# Patient Record
Sex: MALE | Race: WHITE | NOT HISPANIC OR LATINO | Employment: FULL TIME | URBAN - METROPOLITAN AREA
[De-identification: names, ages, dates, MRNs, and addresses within clinical notes are randomized per-mention and may not be internally consistent; named-entity substitution may affect disease eponyms.]

---

## 2022-03-17 ENCOUNTER — OFFICE VISIT (OUTPATIENT)
Dept: FAMILY MEDICINE CLINIC | Facility: CLINIC | Age: 52
End: 2022-03-17

## 2022-03-17 VITALS
TEMPERATURE: 97.8 F | SYSTOLIC BLOOD PRESSURE: 116 MMHG | BODY MASS INDEX: 35.03 KG/M2 | HEART RATE: 74 BPM | WEIGHT: 250.2 LBS | HEIGHT: 71 IN | DIASTOLIC BLOOD PRESSURE: 70 MMHG | OXYGEN SATURATION: 98 %

## 2022-03-17 DIAGNOSIS — D68.9 CLOTTING DISORDER: ICD-10-CM

## 2022-03-17 DIAGNOSIS — E78.5 HYPERLIPIDEMIA, UNSPECIFIED HYPERLIPIDEMIA TYPE: ICD-10-CM

## 2022-03-17 DIAGNOSIS — Z86.718 HISTORY OF RECURRENT DEEP VEIN THROMBOSIS (DVT): ICD-10-CM

## 2022-03-17 DIAGNOSIS — Z00.00 HEALTH MAINTENANCE EXAMINATION: Primary | ICD-10-CM

## 2022-03-17 DIAGNOSIS — Z12.5 SCREENING FOR MALIGNANT NEOPLASM OF PROSTATE: ICD-10-CM

## 2022-03-17 PROCEDURE — 99386 PREV VISIT NEW AGE 40-64: CPT | Performed by: NURSE PRACTITIONER

## 2022-03-17 RX ORDER — WARFARIN SODIUM 5 MG/1
1 TABLET ORAL 2 TIMES DAILY
COMMUNITY
Start: 2022-03-07 | End: 2022-03-23 | Stop reason: SDUPTHER

## 2022-03-17 NOTE — PROGRESS NOTES
Subjective   Darshan Frias is a 52 y.o. male.     Chief Complaint   Patient presents with   • Annual Exam     This is my first time seeing this patient.   History of Present Illness   The patient is being seen for a health maintenance evaluation.  The last health maintenance visit is unknown.  Social history: Household members include spouse, stepdaughter and stepson.  He is .  Work status: Full-time.  The patient has never smoked cigarettes.  He reports occasional alcohol use.  General health: The patient's health is described as good.  He has regular dental visits.  The patient brushes 2 times a day, flosses daily and reports his last dental visit was less than 6 months ago.  He denies vision problems.  Vision care includes no need for vision correction and an eye exam within the past year.  He denies hearing loss.  Immunization status: Shingrix, influenza and Covid vaccinations needed.  Lifestyle: He does not exercise regularly.  Reproductive health: He is sexually active.  Screening: A colonoscopy was performed on 3/16/2020.    The following portions of the patient's history were reviewed and updated as appropriate: allergies, current medications, past family history, past medical history, past social history, past surgical history and problem list.    Past Medical History:   Diagnosis Date   • Deep vein thrombosis (HCC)        History reviewed. No pertinent surgical history.    History reviewed. No pertinent family history.    Social History     Socioeconomic History   • Marital status:    Tobacco Use   • Smoking status: Never Smoker   Substance and Sexual Activity   • Alcohol use: Yes     Comment: occasionally       Review of Systems   Constitutional: Negative for fever.   HENT: Negative for ear pain, rhinorrhea and sore throat.    Eyes: Negative for visual disturbance.   Respiratory: Negative for cough and shortness of breath.    Cardiovascular: Negative for chest pain.   Gastrointestinal:  "Negative for abdominal pain, diarrhea, nausea and vomiting.   Genitourinary: Negative.    Musculoskeletal: Negative.    Skin: Negative for rash.   Neurological: Negative for dizziness and headache.   Psychiatric/Behavioral: Negative for depressed mood.       Objective   Vitals:    03/17/22 1100   BP: 116/70   BP Location: Left arm   Patient Position: Sitting   Cuff Size: Large Adult   Pulse: 74   Temp: 97.8 °F (36.6 °C)   TempSrc: Temporal   SpO2: 98%   Weight: 113 kg (250 lb 3.2 oz)   Height: 180.3 cm (71\")      Body mass index is 34.9 kg/m².  Physical Exam  Vitals and nursing note reviewed.   Constitutional:       Appearance: Normal appearance.   HENT:      Head: Normocephalic and atraumatic.      Right Ear: Tympanic membrane and ear canal normal.      Left Ear: Tympanic membrane and ear canal normal.   Eyes:      Conjunctiva/sclera: Conjunctivae normal.      Pupils: Pupils are equal, round, and reactive to light.   Cardiovascular:      Rate and Rhythm: Normal rate and regular rhythm.      Heart sounds: Normal heart sounds.   Pulmonary:      Effort: Pulmonary effort is normal.      Breath sounds: Normal breath sounds.   Abdominal:      General: Bowel sounds are normal.      Palpations: Abdomen is soft.      Tenderness: There is no abdominal tenderness.   Genitourinary:     Comments: Declined   Musculoskeletal:         General: Normal range of motion.      Cervical back: Neck supple.   Skin:     General: Skin is warm and dry.   Neurological:      Mental Status: He is alert and oriented to person, place, and time.   Psychiatric:         Mood and Affect: Mood normal.           Assessment/Plan   Diagnoses and all orders for this visit:    1. Health maintenance examination (Primary)  -     Varicella zoster antibody, IgG    2. Hyperlipidemia, unspecified hyperlipidemia type  -     Lipid Panel With / Chol / HDL Ratio  -     Comprehensive Metabolic Panel    3. Clotting disorder (HCC)  -     Protime-INR    4. History of " recurrent deep vein thrombosis (DVT)  -     Protime-INR    5. Screening for malignant neoplasm of prostate  -     PSA Screen    Impression, currently, he has an inadequate exercise regimen.  Colorectal cancer screening is current.  PSA ordered today.  Additional screening lab work includes varicella-zoster antibody, lipid panel, CMP and PT/INR.  Patient declines vaccinations at this time.  Advice and education were given regarding diet.

## 2022-03-18 LAB
ALBUMIN SERPL-MCNC: 4.4 G/DL (ref 3.8–4.9)
ALBUMIN/GLOB SERPL: 1.5 {RATIO} (ref 1.2–2.2)
ALP SERPL-CCNC: 109 IU/L (ref 44–121)
ALT SERPL-CCNC: 44 IU/L (ref 0–44)
AST SERPL-CCNC: 29 IU/L (ref 0–40)
BILIRUB SERPL-MCNC: 0.6 MG/DL (ref 0–1.2)
BUN SERPL-MCNC: 13 MG/DL (ref 6–24)
BUN/CREAT SERPL: 14 (ref 9–20)
CALCIUM SERPL-MCNC: 9.5 MG/DL (ref 8.7–10.2)
CHLORIDE SERPL-SCNC: 102 MMOL/L (ref 96–106)
CHOLEST SERPL-MCNC: 213 MG/DL (ref 100–199)
CHOLEST/HDLC SERPL: 4.3 RATIO (ref 0–5)
CO2 SERPL-SCNC: 23 MMOL/L (ref 20–29)
CREAT SERPL-MCNC: 0.94 MG/DL (ref 0.76–1.27)
EGFRCR SERPLBLD CKD-EPI 2021: 98 ML/MIN/1.73
GLOBULIN SER CALC-MCNC: 3 G/DL (ref 1.5–4.5)
GLUCOSE SERPL-MCNC: 82 MG/DL (ref 65–99)
HDLC SERPL-MCNC: 50 MG/DL
INR PPP: 1.6 (ref 0.9–1.2)
LDLC SERPL CALC-MCNC: 145 MG/DL (ref 0–99)
POTASSIUM SERPL-SCNC: 4.7 MMOL/L (ref 3.5–5.2)
PROT SERPL-MCNC: 7.4 G/DL (ref 6–8.5)
PROTHROMBIN TIME: 17.1 SEC (ref 9.1–12)
PSA SERPL-MCNC: 1.2 NG/ML (ref 0–4)
SODIUM SERPL-SCNC: 140 MMOL/L (ref 134–144)
TRIGL SERPL-MCNC: 103 MG/DL (ref 0–149)
VLDLC SERPL CALC-MCNC: 18 MG/DL (ref 5–40)
VZV IGG SER IA-ACNC: 1660 INDEX

## 2022-03-23 ENCOUNTER — TELEPHONE (OUTPATIENT)
Dept: FAMILY MEDICINE CLINIC | Facility: CLINIC | Age: 52
End: 2022-03-23

## 2022-03-23 RX ORDER — WARFARIN SODIUM 5 MG/1
10 TABLET ORAL DAILY
Start: 2022-03-23 | End: 2022-07-13 | Stop reason: SDUPTHER

## 2022-03-23 NOTE — TELEPHONE ENCOUNTER
LDL (bad cholesterol) is elevated at 145 but 10-year CVD risk score is good at 3.9% so would recommend working on diet and exercise.  Blood sugar is normal.  Kidney function tests are normal.  Electrolytes are normal.  Liver function tests are normal.  PSA is within normal range.  INR is low at 1.6 so we need to review current treatment regimen.  Varicella antibody is positive so would recommend Shingrix.    Patient aware of results and recommendations.  Patient had not been adherent to warfarin.  Stressed to patient importance of medication adherence.  Will repeat INR in 2 weeks.

## 2022-03-29 ENCOUNTER — OFFICE VISIT (OUTPATIENT)
Dept: FAMILY MEDICINE CLINIC | Facility: CLINIC | Age: 52
End: 2022-03-29

## 2022-03-29 ENCOUNTER — HOSPITAL ENCOUNTER (OUTPATIENT)
Dept: GENERAL RADIOLOGY | Facility: HOSPITAL | Age: 52
Discharge: HOME | End: 2022-03-29
Admitting: NURSE PRACTITIONER

## 2022-03-29 ENCOUNTER — TELEPHONE (OUTPATIENT)
Dept: FAMILY MEDICINE CLINIC | Facility: CLINIC | Age: 52
End: 2022-03-29

## 2022-03-29 VITALS
BODY MASS INDEX: 35.31 KG/M2 | TEMPERATURE: 97.7 F | SYSTOLIC BLOOD PRESSURE: 119 MMHG | WEIGHT: 252.2 LBS | DIASTOLIC BLOOD PRESSURE: 79 MMHG | OXYGEN SATURATION: 98 % | HEART RATE: 82 BPM | HEIGHT: 71 IN

## 2022-03-29 DIAGNOSIS — R53.83 FATIGUE, UNSPECIFIED TYPE: ICD-10-CM

## 2022-03-29 DIAGNOSIS — M25.562 ACUTE PAIN OF LEFT KNEE: Primary | ICD-10-CM

## 2022-03-29 DIAGNOSIS — Z86.718 HISTORY OF RECURRENT DEEP VEIN THROMBOSIS (DVT): ICD-10-CM

## 2022-03-29 DIAGNOSIS — M25.562 ACUTE PAIN OF LEFT KNEE: ICD-10-CM

## 2022-03-29 PROCEDURE — 99213 OFFICE O/P EST LOW 20 MIN: CPT | Performed by: NURSE PRACTITIONER

## 2022-03-29 PROCEDURE — 73560 X-RAY EXAM OF KNEE 1 OR 2: CPT

## 2022-03-29 NOTE — TELEPHONE ENCOUNTER
Would recommend Ortho referral for small left knee effusion.    Patient aware of results and recommendations.

## 2022-03-30 NOTE — PROGRESS NOTES
New Knee      Patient: Darshan Frias        YOB: 1970    Medical Record Number: [id number]        Chief Complaints: Left knee pain      History of Present Illness: This is a 52-year-old male who presents complaining of left knee pain that began about 2 to 3 weeks ago.  He was sitting on a very low stool when he stood to get up his knee caught popped and had severe pain.  His symptoms are worsening at times he cannot fully extend he has a hard time bearing weight he does have an antalgic gait.  No prior history of similar symptoms he is on chronic Coumadin so cannot take anti-inflammatories been doing ice is been doing stretching strengthening and his symptoms are worsening.  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     No current facility-administered medications on file prior to visit.     Current Medications:  Scheduled Meds:  Continuous Infusions:No current facility-administered medications for this visit.    PRN Meds:.    Past Medical History:   Diagnosis Date   • Deep vein thrombosis (HCC)       No past surgical history on file.     Social History     Occupational History   • Not on file   Tobacco Use   • Smoking status: Never Smoker   • Smokeless tobacco: Not on file   Substance and Sexual Activity   • Alcohol use: Yes     Comment: occasionally   • Drug use: Not on file   • Sexual activity: Not on file      Social History     Social History Narrative   • Not on file      No family history on file.          Review of Systems: 14 point review of systems are remarkable for the knee pain only the remainder negative per the  patient    Review of Systems      Physical Exam: 52 y.o. male  General Appearance:    Alert, cooperative, in no acute distress                 There were no vitals filed for this visit.   Patient is alert and read ×3 no acute distress appears her above-listed at height weight and age.  Affect is normal respiratory rate is normal unlabored. Heart rate regular rate rhythm, sclera, dentition and hearing are normal for the purpose of this exam.        Ortho Exam Physical exam of the left knee reveals no effusion no redness.  The patient does have tenderness about the medial joint line.  No tenderness about the lateral joint line.  A negative bounce home and a positive medial Daniel.  There is some pain medially  with a lateral Daniel.  Patient has a stable ligamentous exam.  Quads are reasonable and symmetric bilaterally.  Calf is soft and nontender.  There is no overlying skin changes no lymphedema lymphadenopathy.  Patient has good hip range of motion full symmetric and asymptomatic and a normal ankle exam.  He does have loss of extension about 3 to 5 degrees full flexion    Procedures             Radiology:   AP, Lateral and merchant views of the left knee  were /reviewed to evauateknee pain.  I have no comparative films these were reviewed in epic they were standing films SCS good maintenance with joint space no acute pathology  Imaging Results (Most Recent)     None        Assessment/Plan:  Left knee pain I suspect this is a meniscus tear he cannot take anti-inflammatories he has difficulty with terminal extension sometimes difficulty bearing weight he has an antalgic gait plan is to proceed with an MRI

## 2022-03-30 NOTE — PROGRESS NOTES
"Chief Complaint  Knee Pain    Subjective          Darshan Frias presents to [facility]  Knee Pain   The incident occurred more than 1 week ago. The incident occurred at home. The injury mechanism was a twisting injury. The pain is present in the left knee. The pain is moderate. Pertinent negatives include no inability to bear weight, muscle weakness or numbness. The symptoms are aggravated by weight bearing. He has tried acetaminophen for the symptoms. The treatment provided no relief.     Objective   Vital Signs:   /79   Pulse 82   Temp 97.7 °F (36.5 °C)   Ht 180.3 cm (70.98\")   Wt 114 kg (252 lb 3.2 oz)   SpO2 98%   BMI 35.19 kg/m²       Physical Exam  Vitals and nursing note reviewed.   Cardiovascular:      Rate and Rhythm: Normal rate and regular rhythm.      Heart sounds: Normal heart sounds.   Pulmonary:      Effort: Pulmonary effort is normal.      Breath sounds: Normal breath sounds.   Musculoskeletal:      Left knee: Tenderness present over the lateral joint line.   Neurological:      Mental Status: He is oriented to person, place, and time.   Psychiatric:         Mood and Affect: Mood normal.        Result Review :            Assessment and Plan    Diagnoses and all orders for this visit:    1. Acute pain of left knee (Primary)  -     XR Knee 1 or 2 View Left; Future    2. Fatigue, unspecified type  -     Testosterone, Free, Total  -     TSH Rfx On Abnormal To Free T4  -     Vitamin D 25 Hydroxy  -     CBC & Differential    3. History of recurrent deep vein thrombosis (DVT)  -     Protime-INR      I spent 20 minutes caring for Darshan on this date of service. This time includes time spent by me in the following activities:performing a medically appropriate examination and/or evaluation , counseling and educating the patient/family/caregiver, ordering medications, tests, or procedures and documenting information in the medical record  Follow Up   Follow-up pending " results.   Patient was given instructions and counseling regarding his condition or for health maintenance advice. Please see specific information pulled into the AVS if appropriate.

## 2022-03-31 ENCOUNTER — OFFICE VISIT (OUTPATIENT)
Dept: ORTHOPEDIC SURGERY | Facility: CLINIC | Age: 52
End: 2022-03-31

## 2022-03-31 ENCOUNTER — TELEPHONE (OUTPATIENT)
Dept: FAMILY MEDICINE CLINIC | Facility: CLINIC | Age: 52
End: 2022-03-31

## 2022-03-31 VITALS — HEIGHT: 71 IN | WEIGHT: 240.1 LBS | TEMPERATURE: 97.6 F | BODY MASS INDEX: 33.61 KG/M2

## 2022-03-31 DIAGNOSIS — S83.242A TEAR OF MEDIAL MENISCUS OF LEFT KNEE, CURRENT, UNSPECIFIED TEAR TYPE, INITIAL ENCOUNTER: Primary | ICD-10-CM

## 2022-03-31 LAB
25(OH)D3+25(OH)D2 SERPL-MCNC: 19.6 NG/ML (ref 30–100)
BASOPHILS # BLD AUTO: 0.1 X10E3/UL (ref 0–0.2)
BASOPHILS NFR BLD AUTO: 1 %
EOSINOPHIL # BLD AUTO: 0.4 X10E3/UL (ref 0–0.4)
EOSINOPHIL NFR BLD AUTO: 5 %
ERYTHROCYTE [DISTWIDTH] IN BLOOD BY AUTOMATED COUNT: 12.5 % (ref 11.6–15.4)
HCT VFR BLD AUTO: 46.5 % (ref 37.5–51)
HGB BLD-MCNC: 15.6 G/DL (ref 13–17.7)
IMM GRANULOCYTES # BLD AUTO: 0 X10E3/UL (ref 0–0.1)
IMM GRANULOCYTES NFR BLD AUTO: 0 %
INR PPP: 2.3 (ref 0.9–1.2)
LYMPHOCYTES # BLD AUTO: 2.2 X10E3/UL (ref 0.7–3.1)
LYMPHOCYTES NFR BLD AUTO: 30 %
MCH RBC QN AUTO: 29.7 PG (ref 26.6–33)
MCHC RBC AUTO-ENTMCNC: 33.5 G/DL (ref 31.5–35.7)
MCV RBC AUTO: 89 FL (ref 79–97)
MONOCYTES # BLD AUTO: 0.4 X10E3/UL (ref 0.1–0.9)
MONOCYTES NFR BLD AUTO: 5 %
NEUTROPHILS # BLD AUTO: 4.3 X10E3/UL (ref 1.4–7)
NEUTROPHILS NFR BLD AUTO: 59 %
PLATELET # BLD AUTO: 253 X10E3/UL (ref 150–450)
PROTHROMBIN TIME: 24.4 SEC (ref 9.1–12)
RBC # BLD AUTO: 5.25 X10E6/UL (ref 4.14–5.8)
TESTOST FREE SERPL-MCNC: 9.4 PG/ML (ref 7.2–24)
TESTOST SERPL-MCNC: 564 NG/DL (ref 264–916)
TSH SERPL DL<=0.005 MIU/L-ACNC: 1.72 UIU/ML (ref 0.45–4.5)
WBC # BLD AUTO: 7.4 X10E3/UL (ref 3.4–10.8)

## 2022-03-31 PROCEDURE — 99203 OFFICE O/P NEW LOW 30 MIN: CPT | Performed by: ORTHOPAEDIC SURGERY

## 2022-03-31 RX ORDER — ERGOCALCIFEROL 1.25 MG/1
50000 CAPSULE ORAL WEEKLY
Qty: 12 CAPSULE | Refills: 1 | Status: SHIPPED | OUTPATIENT
Start: 2022-03-31 | End: 2023-01-11

## 2022-03-31 NOTE — TELEPHONE ENCOUNTER
INR is therapeutic so continue current warfarin dosage.  Will repeat PT/INR in 1 month.  Free and total testosterone levels are normal.  Thyroid function test is normal.  Vitamin D is deficient so will need to start vitamin D2 50,000 units weekly and repeat vitamin D level in 6 months.  CBC is within acceptable limits.    Patient aware of results and recommendations.

## 2022-04-11 ENCOUNTER — HOSPITAL ENCOUNTER (OUTPATIENT)
Dept: MRI IMAGING | Facility: HOSPITAL | Age: 52
Discharge: HOME | End: 2022-04-11
Admitting: ORTHOPAEDIC SURGERY

## 2022-04-11 DIAGNOSIS — S83.242A TEAR OF MEDIAL MENISCUS OF LEFT KNEE, CURRENT, UNSPECIFIED TEAR TYPE, INITIAL ENCOUNTER: ICD-10-CM

## 2022-04-11 PROCEDURE — 73721 MRI JNT OF LWR EXTRE W/O DYE: CPT

## 2022-04-21 ENCOUNTER — CLINICAL SUPPORT (OUTPATIENT)
Dept: ORTHOPEDIC SURGERY | Facility: CLINIC | Age: 52
End: 2022-04-21

## 2022-04-21 VITALS — BODY MASS INDEX: 33.6 KG/M2 | TEMPERATURE: 97.8 F | WEIGHT: 240 LBS | HEIGHT: 71 IN

## 2022-04-21 DIAGNOSIS — M17.12 PRIMARY LOCALIZED OSTEOARTHROSIS OF LEFT LOWER LEG: Primary | ICD-10-CM

## 2022-04-21 PROCEDURE — 99213 OFFICE O/P EST LOW 20 MIN: CPT | Performed by: ORTHOPAEDIC SURGERY

## 2022-04-21 PROCEDURE — 20610 DRAIN/INJ JOINT/BURSA W/O US: CPT | Performed by: ORTHOPAEDIC SURGERY

## 2022-04-21 RX ORDER — METHYLPREDNISOLONE ACETATE 80 MG/ML
80 INJECTION, SUSPENSION INTRA-ARTICULAR; INTRALESIONAL; INTRAMUSCULAR; SOFT TISSUE
Status: COMPLETED | OUTPATIENT
Start: 2022-04-21 | End: 2022-04-21

## 2022-04-21 RX ADMIN — METHYLPREDNISOLONE ACETATE 80 MG: 80 INJECTION, SUSPENSION INTRA-ARTICULAR; INTRALESIONAL; INTRAMUSCULAR; SOFT TISSUE at 09:58

## 2022-04-21 NOTE — PROGRESS NOTES
Patient: Darshan Frias  YOB: 1970  Date of Service: 4/21/2022    Chief Complaints: Left knee pain MRI follow-up    Subjective:    History of Present Illness: Pt is seen in the office today with complaints of left knee pain he is here follow-up MRI.  MRI demonstrates degenerative change the patellofemoral joint primarily on the patella and a medial femoral condyle defect.  No meniscus tear..          Allergies: No Known Allergies    Medications:   Home Medications:  Current Outpatient Medications on File Prior to Visit   Medication Sig   • vitamin D (ERGOCALCIFEROL) 1.25 MG (08663 UT) capsule capsule Take 1 capsule by mouth 1 (One) Time Per Week.   • warfarin (COUMADIN) 5 MG tablet Take 2 tablets by mouth Daily.     No current facility-administered medications on file prior to visit.     Current Medications:  Scheduled Meds:  Continuous Infusions:No current facility-administered medications for this visit.    PRN Meds:.    I have reviewed the patient's medical history in detail and updated the computerized patient record.  Review and summarization of old records include:    Past Medical History:   Diagnosis Date   • Deep vein thrombosis (HCC)       No past surgical history on file.     Social History     Occupational History   • Not on file   Tobacco Use   • Smoking status: Never Smoker   • Smokeless tobacco: Not on file   Substance and Sexual Activity   • Alcohol use: Yes     Comment: occasionally   • Drug use: Not on file   • Sexual activity: Not on file      Social History     Social History Narrative   • Not on file      No family history on file.    ROS: 14 point review of systems was performed and was negative except for documented findings in HPI and today's encounter.     Allergies: No Known Allergies  Constitutional:  Denies fever, shaking or chills   Eyes:  Denies change in visual acuity   HENT:  Denies nasal congestion or sore throat   Respiratory:  Denies cough or shortness of breath    Cardiovascular:  Denies chest pain or severe LE edema   GI:  Denies abdominal pain, nausea, vomiting, bloody stools or diarrhea   Musculoskeletal:  Numbness, tingling, or loss of motor function only as noted above in history of present illness.  : Denies painful urination or hematuria  Integument:  Denies rash, lesion or ulceration   Neurologic:  Denies headache or focal weakness  Endocrine:  Denies lymphadenopathy  Psych:  Denies confusion or change in mental status   Hem:  Denies active bleeding      Physical Exam: 52 y.o. male  Wt Readings from Last 3 Encounters:   03/31/22 109 kg (240 lb 1.6 oz)   03/29/22 114 kg (252 lb 3.2 oz)   03/17/22 113 kg (250 lb 3.2 oz)       There is no height or weight on file to calculate BMI.  No height and weight on file for this encounter.  There were no vitals filed for this visit.  Vital signs reviewed.   General Appearance:    Alert, cooperative, in no acute distress                    Ortho exam  Physical exam of the left knee reveals no effusion no redness.  The patient does have tenderness about the medial joint line.  No tenderness about the lateral joint line.  A negative bounce home and a positive medial Daniel.  There is some pain medially  with a lateral Daniel.  Patient has a stable ligamentous exam.  Quads are reasonable and symmetric bilaterally.  Calf is soft and nontender.  There is no overlying skin changes no lymphedema lymphadenopathy.  Patient has good hip range of motion full symmetric and asymptomatic and a normal ankle exam.  Large Joint Arthrocentesis: L knee  Date/Time: 4/21/2022 9:58 AM  Consent given by: patient  Site marked: site marked  Timeout: Immediately prior to procedure a time out was called to verify the correct patient, procedure, equipment, support staff and site/side marked as required   Supporting Documentation  Indications: pain   Procedure Details  Location: knee - L knee  Preparation: Patient was prepped and draped in the usual  sterile fashion  Needle gauge: 21.  Approach: medial  Medications administered: 2 mL lidocaine (cardiac); 80 mg methylPREDNISolone acetate 80 MG/ML  Patient tolerance: patient tolerated the procedure well with no immediate complications          MRIs as above have reviewed the films myself and agree with the findings  .time    Assessment: Left knee pain I suspect is coming from the degenerative changes at his patella and the medial femoral condyle defect I explained to him what this is I showed it to him on MRI.  Explained the natural history of these things to get worse and is imperative that he continue to work on strengthening and weight loss.  I think he is lost about 30 pounds with think he is really working on it from that end.  Talked about options here I think injections quite reasonable could consider gel in the future could consider 's we will see how he does with the cortisone    Plan:   Follow up as indicated.  Ice, elevate, and rest as needed.  Discussed conservative measures of pain control including ice, bracing.  Also talked about the importance of strengthening and maintaining ideal body weight    Bessie Bergeron M.D.

## 2022-07-13 RX ORDER — WARFARIN SODIUM 5 MG/1
10 TABLET ORAL DAILY
Qty: 60 TABLET | Refills: 0 | Status: SHIPPED | OUTPATIENT
Start: 2022-07-13 | End: 2022-11-11 | Stop reason: SDUPTHER

## 2022-07-13 NOTE — TELEPHONE ENCOUNTER
pebbles    Caller: Darshan Frias    Relationship: Self    Best call back number: [phone]    Requested Prescriptions:   Requested Prescriptions     Pending Prescriptions Disp Refills   • warfarin (COUMADIN) 5 MG tablet       Sig: Take 2 tablets by mouth Daily.        [facility] [address] [facility] [phone] [phone] [facility]     Additional details provided by patient: PATIENT WOULD ALSO LIKE TO GO BACK ON TADALAFIL. HE USED TO TAKE IT BUT HAS NOT IN THREE MONTHS. PLEASE ADVISE.       Does the patient have less than a 3 day supply:  [] Yes  [x] No    Ronny Dominguez Rep   07/13/22 10:37 EDT

## 2022-07-15 ENCOUNTER — ANTICOAGULATION VISIT (OUTPATIENT)
Dept: FAMILY MEDICINE CLINIC | Facility: CLINIC | Age: 52
End: 2022-07-15

## 2022-07-15 DIAGNOSIS — D68.9 CLOTTING DISORDER: Primary | ICD-10-CM

## 2022-07-15 LAB — INR PPP: 2 (ref 2–3)

## 2022-07-15 PROCEDURE — 36416 COLLJ CAPILLARY BLOOD SPEC: CPT | Performed by: NURSE PRACTITIONER

## 2022-07-15 PROCEDURE — 85610 PROTHROMBIN TIME: CPT | Performed by: NURSE PRACTITIONER

## 2022-08-02 LAB — INR PPP: 2 (ref 2–3)

## 2022-11-11 ENCOUNTER — CLINICAL SUPPORT (OUTPATIENT)
Dept: FAMILY MEDICINE CLINIC | Facility: CLINIC | Age: 52
End: 2022-11-11

## 2022-11-11 ENCOUNTER — ANTICOAGULATION VISIT (OUTPATIENT)
Dept: FAMILY MEDICINE CLINIC | Facility: CLINIC | Age: 52
End: 2022-11-11
Payer: COMMERCIAL

## 2022-11-11 VITALS
BODY MASS INDEX: 35.45 KG/M2 | SYSTOLIC BLOOD PRESSURE: 134 MMHG | DIASTOLIC BLOOD PRESSURE: 94 MMHG | TEMPERATURE: 97.6 F | OXYGEN SATURATION: 96 % | WEIGHT: 253.2 LBS | HEIGHT: 71 IN | HEART RATE: 70 BPM

## 2022-11-11 DIAGNOSIS — Z86.718 HISTORY OF RECURRENT DEEP VEIN THROMBOSIS (DVT): Primary | ICD-10-CM

## 2022-11-11 LAB — INR PPP: 1.1 (ref 2–3)

## 2022-11-11 PROCEDURE — 36416 COLLJ CAPILLARY BLOOD SPEC: CPT | Performed by: NURSE PRACTITIONER

## 2022-11-11 PROCEDURE — 85610 PROTHROMBIN TIME: CPT | Performed by: NURSE PRACTITIONER

## 2022-11-11 RX ORDER — WARFARIN SODIUM 5 MG/1
10 TABLET ORAL DAILY
Qty: 180 TABLET | Refills: 3 | Status: SHIPPED | OUTPATIENT
Start: 2022-11-11

## 2022-11-11 RX ORDER — TADALAFIL 10 MG/1
10 TABLET ORAL DAILY PRN
Qty: 30 TABLET | Refills: 1 | Status: SHIPPED | OUTPATIENT
Start: 2022-11-11 | End: 2023-01-11 | Stop reason: SDUPTHER

## 2022-11-28 ENCOUNTER — ANTICOAGULATION VISIT (OUTPATIENT)
Dept: FAMILY MEDICINE CLINIC | Facility: CLINIC | Age: 52
End: 2022-11-28

## 2022-11-28 DIAGNOSIS — D68.9 CLOTTING DISORDER: Primary | ICD-10-CM

## 2022-11-28 LAB — INR PPP: 1.7 (ref 2–3)

## 2022-11-28 PROCEDURE — 85610 PROTHROMBIN TIME: CPT | Performed by: NURSE PRACTITIONER

## 2022-11-28 PROCEDURE — 36416 COLLJ CAPILLARY BLOOD SPEC: CPT | Performed by: NURSE PRACTITIONER

## 2022-12-12 ENCOUNTER — ANTICOAGULATION VISIT (OUTPATIENT)
Dept: FAMILY MEDICINE CLINIC | Facility: CLINIC | Age: 52
End: 2022-12-12

## 2022-12-12 DIAGNOSIS — D68.9 CLOTTING DISORDER: Primary | ICD-10-CM

## 2022-12-12 LAB — INR PPP: 2.8 (ref 2–3)

## 2022-12-12 PROCEDURE — 36416 COLLJ CAPILLARY BLOOD SPEC: CPT | Performed by: NURSE PRACTITIONER

## 2022-12-12 PROCEDURE — 85610 PROTHROMBIN TIME: CPT | Performed by: NURSE PRACTITIONER

## 2023-01-08 PROBLEM — Z00.00 ROUTINE HEALTH MAINTENANCE: Status: ACTIVE | Noted: 2023-01-08

## 2023-01-08 PROBLEM — N52.9 ERECTILE DYSFUNCTION: Status: ACTIVE | Noted: 2023-01-08

## 2023-01-08 PROBLEM — Z79.01 CHRONIC ANTICOAGULATION: Status: ACTIVE | Noted: 2023-01-08

## 2023-01-08 PROBLEM — Z86.718 HISTORY OF DVT (DEEP VEIN THROMBOSIS): Status: ACTIVE | Noted: 2023-01-08

## 2023-01-08 PROBLEM — D68.52 PROTHROMBIN GENE MUTATION: Status: ACTIVE | Noted: 2023-01-08

## 2023-01-08 PROBLEM — E55.9 VITAMIN D DEFICIENCY: Status: ACTIVE | Noted: 2023-01-08

## 2023-01-08 NOTE — PROGRESS NOTES
"Chief Complaint  Establish Care and Vitamin D Deficiency    Subjective      History of Present Illness    Darshan Frias presents to [facility] for a new patient visit. He has a history of PGM and recurrent DVT for which he has been on coumadin for many years. He prefers to stay on Warfarin as opposed to swapping for DOAC, 10 mg daily has been working for him. INR of 1.1 approx 2 mos ago was because pt held Warfarin for dental procedure.      Past Medical History:   Diagnosis Date   • Deep vein thrombosis (HCC)      Patient Active Problem List    Diagnosis    • Prothrombin gene mutation (HCC) [D68.52]    • Routine health maintenance [Z00.00]    • Chronic anticoagulation [Z79.01]    • Erectile dysfunction [N52.9]    • History of DVT (deep vein thrombosis) [Z86.718]    • Vitamin D deficiency [E55.9]    • Clotting disorder (HCC) [D68.9]      History reviewed. No pertinent family history.     History reviewed. No pertinent surgical history.     Social History     Socioeconomic History   • Marital status:    Tobacco Use   • Smoking status: Never   • Smokeless tobacco: Never   Vaping Use   • Vaping Use: Never used   Substance and Sexual Activity   • Alcohol use: Yes     Comment: occasionally   • Drug use: Never   • Sexual activity: Yes     Partners: Female      Objective       Current Outpatient Medications:   •  tadalafil (CIALIS) 10 MG tablet, Take 1 tablet by mouth Daily As Needed for Erectile Dysfunction., Disp: 30 tablet, Rfl: 7  •  warfarin (COUMADIN) 5 MG tablet, Take 2 tablets by mouth Daily., Disp: 180 tablet, Rfl: 3    Vital Signs:      /80   Pulse 78   Temp 97.3 °F (36.3 °C)   Ht 180.3 cm (70.98\")   Wt 118 kg (260 lb)   SpO2 95%   BMI 36.28 kg/m²     Vitals:    01/11/23 1427   BP: 124/80   Pulse: 78   Temp: 97.3 °F (36.3 °C)   SpO2: 95%   Weight: 118 kg (260 lb)   Height: 180.3 cm (70.98\")      Physical Exam  Constitutional:       General: He is not in acute " distress.     Appearance: Normal appearance. He is not toxic-appearing.   HENT:      Head: Normocephalic and atraumatic.      Mouth/Throat:      Mouth: Mucous membranes are moist.   Eyes:      General: No scleral icterus.     Conjunctiva/sclera: Conjunctivae normal.   Cardiovascular:      Rate and Rhythm: Normal rate and regular rhythm.      Heart sounds: Normal heart sounds. No murmur heard.    No friction rub. No gallop.   Pulmonary:      Effort: Pulmonary effort is normal. No respiratory distress.      Breath sounds: Normal breath sounds.   Musculoskeletal:         General: No swelling, tenderness or deformity. Normal range of motion.      Cervical back: Normal range of motion and neck supple.   Skin:     General: Skin is warm and dry.      Findings: No lesion or rash.   Neurological:      General: No focal deficit present.      Mental Status: He is alert and oriented to person, place, and time.   Psychiatric:         Mood and Affect: Mood normal.         Behavior: Behavior normal.         Judgment: Judgment normal.        Result Review :   The following data was reviewed by: Susanne Richard MD on 01/11/2023:  CMP    CMP 3/17/22   Glucose 82   BUN 13   Creatinine 0.94   Sodium 140   Potassium 4.7   Chloride 102   Calcium 9.5   Total Protein 7.4   Albumin 4.4   Globulin 3.0   Total Bilirubin 0.6   Alkaline Phosphatase 109   AST (SGOT) 29   ALT (SGPT) 44   BUN/Creatinine Ratio 14           CBC    CBC 3/29/22   WBC 7.4   RBC 5.25   Hemoglobin 15.6   Hematocrit 46.5   MCV 89   MCH 29.7   MCHC 33.5   RDW 12.5   Platelets 253           INR    [facility] 7/15/22 8/2/22 11/11/22 11/28/22 12/12/22 1/11/23   INR 2 (C) 2.00 (A) 1.10 (A) 1.70 (A) 2.80 2.0 (A)   (A) Abnormal value (C) Corrected value       Comments are available for some flowsheets but are not being displayed.           Data reviewed: Prior PCP notes          Assessment and Plan     Diagnoses and all orders for this visit:      1. History of DVT (deep  vein thrombosis)  Assessment & Plan:  Hx DVT x 3 in setting of PGM, agree with life-long AC. Discussed continuation of warfarin versus changing to DOAC. No issues with Warfarin, pt on stable dose and prefers to continue on Warfarin. Needs monthly INRs.     2. Erectile dysfunction, unspecified erectile dysfunction type  Assessment & Plan:  Tadalafil working well for pt, takes prn, will refill    -     tadalafil (CIALIS) 10 MG tablet; Take 1 tablet by mouth Daily As Needed for Erectile Dysfunction.  Dispense: 30 tablet; Refill: 7    3. BMI 35.0-35.9,adult (Primary)  -     Hemoglobin A1c; Future    4. Screening for HIV without presence of risk factors  -     HIV-1 / O / 2 Ag / Antibody 4th Generation; Future    5. Encounter for hepatitis C screening test for low risk patient  -     Hepatitis C antibody; Future    6. Routine health maintenance  Assessment & Plan:  Shingrix today  Annual wellness visit with labs before in March    Orders:  -     PSA SCREENING; Future    7. Prothrombin gene mutation (HCC)  -     POC Protime / INR  -     CBC w AUTO Differential; Future    8. Chronic anticoagulation  -     POC Protime / INR  -     CBC w AUTO Differential; Future    9. Vitamin D deficiency  -     Vitamin D 25 hydroxy; Future    10. Screening for lipid disorders  -     Comprehensive metabolic panel; Future  -     Lipid panel; Future    11. BMI 30.0-30.9,adult    12. Screening for prostate cancer  -     PSA SCREENING; Future    Other orders  -     Shingrix Vaccine- Need for vaccination    Follow Up   Return in about 2 months (around 3/11/2023) for Adult Wellness Visit-30 Minutes.  Patient was given instructions and counseling regarding his condition or for health maintenance advice. Please see specific information pulled into the AVS if appropriate.    There are no Patient Instructions on file for this visit.

## 2023-01-11 ENCOUNTER — OFFICE VISIT (OUTPATIENT)
Dept: FAMILY MEDICINE CLINIC | Facility: CLINIC | Age: 53
End: 2023-01-11
Payer: COMMERCIAL

## 2023-01-11 VITALS
BODY MASS INDEX: 36.4 KG/M2 | HEIGHT: 71 IN | OXYGEN SATURATION: 95 % | TEMPERATURE: 97.3 F | WEIGHT: 260 LBS | HEART RATE: 78 BPM | SYSTOLIC BLOOD PRESSURE: 124 MMHG | DIASTOLIC BLOOD PRESSURE: 80 MMHG

## 2023-01-11 DIAGNOSIS — Z86.718 HISTORY OF DVT (DEEP VEIN THROMBOSIS): ICD-10-CM

## 2023-01-11 DIAGNOSIS — Z11.4 SCREENING FOR HIV WITHOUT PRESENCE OF RISK FACTORS: ICD-10-CM

## 2023-01-11 DIAGNOSIS — Z11.59 ENCOUNTER FOR HEPATITIS C SCREENING TEST FOR LOW RISK PATIENT: ICD-10-CM

## 2023-01-11 DIAGNOSIS — Z00.00 ROUTINE HEALTH MAINTENANCE: ICD-10-CM

## 2023-01-11 DIAGNOSIS — D68.52 PROTHROMBIN GENE MUTATION: ICD-10-CM

## 2023-01-11 DIAGNOSIS — Z13.220 SCREENING FOR LIPID DISORDERS: ICD-10-CM

## 2023-01-11 DIAGNOSIS — N52.9 ERECTILE DYSFUNCTION, UNSPECIFIED ERECTILE DYSFUNCTION TYPE: ICD-10-CM

## 2023-01-11 DIAGNOSIS — Z79.01 CHRONIC ANTICOAGULATION: ICD-10-CM

## 2023-01-11 DIAGNOSIS — E55.9 VITAMIN D DEFICIENCY: ICD-10-CM

## 2023-01-11 DIAGNOSIS — Z12.5 SCREENING FOR PROSTATE CANCER: ICD-10-CM

## 2023-01-11 LAB
INR PPP: 2 (ref 0.9–1.1)
PROTHROMBIN TIME: 23.7 SECONDS

## 2023-01-11 PROCEDURE — 90750 HZV VACC RECOMBINANT IM: CPT | Performed by: INTERNAL MEDICINE

## 2023-01-11 PROCEDURE — 36416 COLLJ CAPILLARY BLOOD SPEC: CPT | Performed by: INTERNAL MEDICINE

## 2023-01-11 PROCEDURE — 85610 PROTHROMBIN TIME: CPT | Performed by: INTERNAL MEDICINE

## 2023-01-11 PROCEDURE — 99204 OFFICE O/P NEW MOD 45 MIN: CPT | Performed by: INTERNAL MEDICINE

## 2023-01-11 PROCEDURE — 90471 IMMUNIZATION ADMIN: CPT | Performed by: INTERNAL MEDICINE

## 2023-01-11 RX ORDER — TADALAFIL 10 MG/1
10 TABLET ORAL DAILY PRN
Qty: 30 TABLET | Refills: 7 | Status: SHIPPED | OUTPATIENT
Start: 2023-01-11

## 2023-01-11 NOTE — ASSESSMENT & PLAN NOTE
Hx DVT x 3 in setting of PGM, agree with life-long AC. Discussed continuation of warfarin versus changing to DOAC. No issues with Warfarin, pt on stable dose and prefers to continue on Warfarin. Needs monthly INRs.

## 2023-03-13 ENCOUNTER — TELEPHONE (OUTPATIENT)
Dept: FAMILY MEDICINE CLINIC | Facility: CLINIC | Age: 53
End: 2023-03-13
Payer: COMMERCIAL

## 2023-03-13 DIAGNOSIS — Z12.5 SCREENING FOR PROSTATE CANCER: ICD-10-CM

## 2023-03-13 DIAGNOSIS — Z00.00 ROUTINE HEALTH MAINTENANCE: ICD-10-CM

## 2023-03-13 DIAGNOSIS — E66.9 OBESITY (BMI 30-39.9): ICD-10-CM

## 2023-03-13 DIAGNOSIS — Z79.01 CHRONIC ANTICOAGULATION: ICD-10-CM

## 2023-03-13 DIAGNOSIS — D68.52 PROTHROMBIN GENE MUTATION: Primary | ICD-10-CM

## 2023-03-13 DIAGNOSIS — Z13.1 SCREENING FOR DIABETES MELLITUS: ICD-10-CM

## 2023-03-13 DIAGNOSIS — E55.9 VITAMIN D DEFICIENCY: ICD-10-CM

## 2023-03-13 DIAGNOSIS — Z13.220 SCREENING FOR LIPID DISORDERS: ICD-10-CM

## 2023-03-13 DIAGNOSIS — Z11.59 ENCOUNTER FOR HEPATITIS C SCREENING TEST FOR LOW RISK PATIENT: ICD-10-CM

## 2023-03-13 NOTE — PROGRESS NOTES
"Chief Complaint  Annual Exam (PT / INR 33.3 / 2.8)    Keysha Sexton presents to [facility] for physical/AWV.    He has no specific complaints, year for annual wellness visit.    Zoster Vaccine: Prefers to avoid today as he has some meetings coming up this week and the first dose made him feel pretty crummy.     Reviewed colon cancer screening, most recent colonoscopy in 2020 was normal.    Discussed risk and benefits of prostate cancer screening, patient would like to be screened.    Declines HIV and hepatitis C testing.    Continues to take warfarin 10 mg daily without complication.    Blood pressure a bit up today but typically is in normal range.        Objective   Vital Signs:   Vitals:    03/20/23 1336   BP: 132/98   Pulse: 71   Temp: 97.6 °F (36.4 °C)   SpO2: 97%   Weight: 115 kg (254 lb)   Height: 180.3 cm (70.98\")                Physical Exam  Constitutional:       General: He is not in acute distress.     Appearance: Normal appearance. He is not toxic-appearing.   HENT:      Head: Normocephalic and atraumatic.      Mouth/Throat:      Mouth: Mucous membranes are moist.   Eyes:      General: No scleral icterus.     Conjunctiva/sclera: Conjunctivae normal.   Cardiovascular:      Rate and Rhythm: Normal rate and regular rhythm.      Heart sounds: Normal heart sounds. No murmur heard.    No friction rub. No gallop.   Pulmonary:      Effort: Pulmonary effort is normal. No respiratory distress.      Breath sounds: Normal breath sounds.   Musculoskeletal:         General: No swelling, tenderness or deformity. Normal range of motion.      Cervical back: Normal range of motion and neck supple.      Comments: Trace lower extremity edema, right greater than left (chronic)   Skin:     General: Skin is warm and dry.      Findings: No lesion or rash.   Neurological:      General: No focal deficit present.      Mental Status: He is alert and oriented to person, place, and time. "   Psychiatric:         Mood and Affect: Mood normal.         Behavior: Behavior normal.         Judgment: Judgment normal.          Result Review :     The following data was reviewed by: Susanne Richard MD on 03/20/2023:           Assessment and Plan    Diagnoses and all orders for this visit:    1. Encounter for annual physical exam (Primary)    2. Chronic anticoagulation  -     CBC w AUTO Differential    3. Vitamin D deficiency  Assessment & Plan:  Check vitamin D level    Orders:  -     Vitamin D 25 hydroxy    4. Hyperlipidemia, unspecified hyperlipidemia type  -     Comprehensive metabolic panel  -     Lipid panel    5. BMI 35.0-35.9,adult    6. Screening for prostate cancer  -     PSA SCREENING    7. Screening for diabetes mellitus  -     Hemoglobin A1c    8. Routine adult health maintenance  -     CBC w AUTO Differential  -     Comprehensive metabolic panel    9. Prothrombin gene mutation (HCC)    10. History of DVT (deep vein thrombosis)  Assessment & Plan:  States that his right leg is chronically a bit more swollen than his left, no changes there.  Told patient we need to make sure that he is coming in monthly for INR's, even though INRs have been stable.  INR stable at 2.8 today, no bleeding issues.  States he does not yet need refills of warfarin.  Continue warfarin 10 mg daily.  Follow-up in 1 month for nurse visit for INR.    11. Routine health maintenance  Assessment & Plan:  Colon cancer screening is up-to-date, had normal colonoscopy in 2020, repeat in 2030.    Discussed risk and benefits of prostate cancer screening with the PSA test. I counseled pt that the lifetime risk of a prostate cancer diagnosis is about 1 in 8. Most cases of prostate cancer are non-life threatening, but there is an increasing incidence of high-grade and metastatic cancer.     I explained that if the PSA test is elevated, the next step would be a urology referral with possible prostate biopsy. I discussed possible  treatments for prostate cancer, including but not limited to surgical resection, radiation therapy, and medication, and possible side effects of those treatments including urinary incontinence, erectile dysfunction, and genital/pelvic pain and dysfunction.    Using shared decision-making, the patient prefers to proceed with prostate cancer screening.     He is due for the second shingles vaccine, prefers to hold off today as the first 1 made him feel pretty crummy (normal immunologic reaction).  I told him he can either make a nurse visit here for a vaccine or he can get it at the local pharmacy.    Declines HIV and hepatitis C testing.      Follow Up   Return in about 6 months (around 9/20/2023) for Recheck, Next scheduled follow up.  Patient was given instructions and counseling regarding his condition or for health maintenance advice. Please see specific information pulled into the AVS if appropriate.

## 2023-03-13 NOTE — TELEPHONE ENCOUNTER
----- Message from Susanne Richard MD sent at 3/13/2023  7:40 AM EDT -----  Regarding: INR  Romain Ko,    I was reviewing charts of my pts on [facility] and noticed that Mr. Frias has not had an INR since his visit with me. He needs monthly INRs in order to stay on Warfarin. Can you give pt a call and get him in the office here for INR?    Thanks!    Susanne

## 2023-03-14 ENCOUNTER — TELEPHONE (OUTPATIENT)
Dept: FAMILY MEDICINE CLINIC | Facility: CLINIC | Age: 53
End: 2023-03-14
Payer: COMMERCIAL

## 2023-03-14 NOTE — TELEPHONE ENCOUNTER
----- Message from Susanne Richard MD sent at 3/13/2023  5:23 PM EDT -----  Regarding: Labs  Rafy Ko, saw your reply about Mr. Frias.  Ideally, it would be awesome if he could come in this week for blood work so that way we can have it available to discuss at his visit.  However, if patient unable to make it in for labs, I can understand that and we can plan to check when he comes in for his visit.  I went ahead and placed lab orders in the event he does choose to get labs done this week prior to his appointment.

## 2023-03-20 ENCOUNTER — OFFICE VISIT (OUTPATIENT)
Dept: FAMILY MEDICINE CLINIC | Facility: CLINIC | Age: 53
End: 2023-03-20
Payer: COMMERCIAL

## 2023-03-20 VITALS
BODY MASS INDEX: 35.56 KG/M2 | HEIGHT: 71 IN | TEMPERATURE: 97.6 F | WEIGHT: 254 LBS | HEART RATE: 71 BPM | SYSTOLIC BLOOD PRESSURE: 132 MMHG | DIASTOLIC BLOOD PRESSURE: 98 MMHG | OXYGEN SATURATION: 97 %

## 2023-03-20 DIAGNOSIS — Z86.718 HISTORY OF DVT (DEEP VEIN THROMBOSIS): ICD-10-CM

## 2023-03-20 DIAGNOSIS — Z00.00 ROUTINE HEALTH MAINTENANCE: ICD-10-CM

## 2023-03-20 DIAGNOSIS — Z13.1 SCREENING FOR DIABETES MELLITUS: ICD-10-CM

## 2023-03-20 DIAGNOSIS — E78.5 HYPERLIPIDEMIA, UNSPECIFIED HYPERLIPIDEMIA TYPE: ICD-10-CM

## 2023-03-20 DIAGNOSIS — Z12.5 SCREENING FOR PROSTATE CANCER: ICD-10-CM

## 2023-03-20 DIAGNOSIS — D68.52 PROTHROMBIN GENE MUTATION: ICD-10-CM

## 2023-03-20 DIAGNOSIS — E55.9 VITAMIN D DEFICIENCY: ICD-10-CM

## 2023-03-20 DIAGNOSIS — Z79.01 CHRONIC ANTICOAGULATION: ICD-10-CM

## 2023-03-20 DIAGNOSIS — Z00.00 ENCOUNTER FOR ANNUAL PHYSICAL EXAM: Primary | ICD-10-CM

## 2023-03-20 DIAGNOSIS — Z00.00 ROUTINE ADULT HEALTH MAINTENANCE: ICD-10-CM

## 2023-03-20 LAB — INR PPP: 2.8 (ref 2–3)

## 2023-03-20 PROCEDURE — 85610 PROTHROMBIN TIME: CPT | Performed by: INTERNAL MEDICINE

## 2023-03-20 PROCEDURE — 99396 PREV VISIT EST AGE 40-64: CPT | Performed by: INTERNAL MEDICINE

## 2023-03-20 PROCEDURE — 36416 COLLJ CAPILLARY BLOOD SPEC: CPT | Performed by: INTERNAL MEDICINE

## 2023-03-20 NOTE — ASSESSMENT & PLAN NOTE
States that his right leg is chronically a bit more swollen than his left, no changes there.  Told patient we need to make sure that he is coming in monthly for INR's, even though INRs have been stable.  INR stable at 2.8 today, no bleeding issues.  States he does not yet need refills of warfarin.  Continue warfarin 10 mg daily.  Follow-up in 1 month for nurse visit for INR.

## 2023-03-20 NOTE — ASSESSMENT & PLAN NOTE
Colon cancer screening is up-to-date, had normal colonoscopy in 2020, repeat in 2030.    Discussed risk and benefits of prostate cancer screening with the PSA test. I counseled pt that the lifetime risk of a prostate cancer diagnosis is about 1 in 8. Most cases of prostate cancer are non-life threatening, but there is an increasing incidence of high-grade and metastatic cancer.     I explained that if the PSA test is elevated, the next step would be a urology referral with possible prostate biopsy. I discussed possible treatments for prostate cancer, including but not limited to surgical resection, radiation therapy, and medication, and possible side effects of those treatments including urinary incontinence, erectile dysfunction, and genital/pelvic pain and dysfunction.    Using shared decision-making, the patient prefers to proceed with prostate cancer screening.     He is due for the second shingles vaccine, prefers to hold off today as the first 1 made him feel pretty crummy (normal immunologic reaction).  I told him he can either make a nurse visit here for a vaccine or he can get it at the local pharmacy.    Declines HIV and hepatitis C testing.

## 2023-03-21 LAB
25(OH)D3+25(OH)D2 SERPL-MCNC: 23.7 NG/ML (ref 30–100)
ALBUMIN SERPL-MCNC: 4.6 G/DL (ref 3.5–5.2)
ALBUMIN/GLOB SERPL: 1.5 G/DL
ALP SERPL-CCNC: 127 U/L (ref 39–117)
ALT SERPL-CCNC: 51 U/L (ref 1–41)
AST SERPL-CCNC: 24 U/L (ref 1–40)
BASOPHILS # BLD AUTO: 0.1 10*3/MM3 (ref 0–0.2)
BASOPHILS NFR BLD AUTO: 0.9 % (ref 0–1.5)
BILIRUB SERPL-MCNC: 0.7 MG/DL (ref 0–1.2)
BUN SERPL-MCNC: 16 MG/DL (ref 6–20)
BUN/CREAT SERPL: 18 (ref 7–25)
CALCIUM SERPL-MCNC: 9.9 MG/DL (ref 8.6–10.5)
CHLORIDE SERPL-SCNC: 102 MMOL/L (ref 98–107)
CHOLEST SERPL-MCNC: 272 MG/DL (ref 0–200)
CO2 SERPL-SCNC: 27.4 MMOL/L (ref 22–29)
CREAT SERPL-MCNC: 0.89 MG/DL (ref 0.76–1.27)
EGFRCR SERPLBLD CKD-EPI 2021: 102.5 ML/MIN/1.73
EOSINOPHIL # BLD AUTO: 0.4 10*3/MM3 (ref 0–0.4)
EOSINOPHIL NFR BLD AUTO: 3.8 % (ref 0.3–6.2)
ERYTHROCYTE [DISTWIDTH] IN BLOOD BY AUTOMATED COUNT: 12.9 % (ref 12.3–15.4)
GLOBULIN SER CALC-MCNC: 3 GM/DL
GLUCOSE SERPL-MCNC: 79 MG/DL (ref 65–99)
HBA1C MFR BLD: 5.6 % (ref 4.8–5.6)
HCT VFR BLD AUTO: 48 % (ref 37.5–51)
HDLC SERPL-MCNC: 60 MG/DL (ref 40–60)
HGB BLD-MCNC: 16.5 G/DL (ref 13–17.7)
IMM GRANULOCYTES # BLD AUTO: 0.04 10*3/MM3 (ref 0–0.05)
IMM GRANULOCYTES NFR BLD AUTO: 0.4 % (ref 0–0.5)
LDLC SERPL CALC-MCNC: 188 MG/DL (ref 0–100)
LYMPHOCYTES # BLD AUTO: 2.27 10*3/MM3 (ref 0.7–3.1)
LYMPHOCYTES NFR BLD AUTO: 21.3 % (ref 19.6–45.3)
MCH RBC QN AUTO: 30.2 PG (ref 26.6–33)
MCHC RBC AUTO-ENTMCNC: 34.4 G/DL (ref 31.5–35.7)
MCV RBC AUTO: 87.9 FL (ref 79–97)
MONOCYTES # BLD AUTO: 0.66 10*3/MM3 (ref 0.1–0.9)
MONOCYTES NFR BLD AUTO: 6.2 % (ref 5–12)
NEUTROPHILS # BLD AUTO: 7.17 10*3/MM3 (ref 1.7–7)
NEUTROPHILS NFR BLD AUTO: 67.4 % (ref 42.7–76)
NRBC BLD AUTO-RTO: 0 /100 WBC (ref 0–0.2)
PLATELET # BLD AUTO: 259 10*3/MM3 (ref 140–450)
POTASSIUM SERPL-SCNC: 4.7 MMOL/L (ref 3.5–5.2)
PROT SERPL-MCNC: 7.6 G/DL (ref 6–8.5)
PSA SERPL-MCNC: 1.32 NG/ML (ref 0–4)
RBC # BLD AUTO: 5.46 10*6/MM3 (ref 4.14–5.8)
SODIUM SERPL-SCNC: 140 MMOL/L (ref 136–145)
TRIGL SERPL-MCNC: 131 MG/DL (ref 0–150)
VLDLC SERPL CALC-MCNC: 24 MG/DL (ref 5–40)
WBC # BLD AUTO: 10.64 10*3/MM3 (ref 3.4–10.8)

## 2023-03-22 ENCOUNTER — TELEPHONE (OUTPATIENT)
Dept: FAMILY MEDICINE CLINIC | Facility: CLINIC | Age: 53
End: 2023-03-22
Payer: COMMERCIAL

## 2023-03-22 RX ORDER — ACYCLOVIR 400 MG/1
400 TABLET ORAL 3 TIMES DAILY
Qty: 15 TABLET | Refills: 2 | Status: SHIPPED | OUTPATIENT
Start: 2023-03-22 | End: 2023-03-27

## 2023-03-22 NOTE — TELEPHONE ENCOUNTER
Caller: Darshan Frias    Relationship: Self    Best call back number: [phone]    What medication are you requesting: ACYCLOVIR    What are your current symptoms: COLD SORES    How long have you been experiencing symptoms: STARTED AFTERNOON OF 03-    Have you had these symptoms before:    [x] Yes  [] No    Have you been treated for these symptoms before:   [x] Yes  [] No    If a prescription is needed, what is your preferred pharmacy and phone number: [facility] [id number] [place], [place] - [address] [facility] [phone] [facility] [phone]      Additional notes: PATIENT STATED HE WAS PRESCRIBED THIS PREVIOUSLY FOR COLD SORES BY YOLIE ROJAS [facility] PROVIDER.    PATIENT STATED HE IS EXPERIENCING SYMPTOMS AND WOULD LIKE TO HAVE THIS MEDICATION PRESCRIBED AGAIN BY LOUIE KRAUS.

## 2023-05-08 ENCOUNTER — TELEPHONE (OUTPATIENT)
Dept: FAMILY MEDICINE CLINIC | Facility: CLINIC | Age: 53
End: 2023-05-08

## 2023-05-08 NOTE — TELEPHONE ENCOUNTER
Hub staff attempted to follow warm transfer process and was unsuccessful     Caller: Darshan Frias    Relationship to patient: Self    Best call back number: [phone]    Patient is needing: PATIENT STATED HE IS NEEDING TO SCHEDULE AN INR APPOINTMENT ASAP.

## 2023-07-07 ENCOUNTER — TELEPHONE (OUTPATIENT)
Dept: FAMILY MEDICINE CLINIC | Facility: CLINIC | Age: 53
End: 2023-07-07

## 2023-07-07 NOTE — TELEPHONE ENCOUNTER
Caller: Darshan Frias    Relationship: Self    Best call back number: [phone]     What is the best time to reach you: ANY    Who are you requesting to speak with (clinical staff, provider,  specific staff member): CLINICAL STAFF    Do you know the name of the person who called:      What was the call regarding: PATIENT CALLED STATED THE PHARMACY TOLD HIM THE MEDICATIONS ORDERED YESTERDAY methylPREDNISolone (Medrol) 4 MG dose pack,     azithromycin (Zithromax Z-Agustin) 250 MG tablet    CONFLICT WITH HIS BLOOD THINNER MEDICATION     warfarin (COUMADIN) 5 MG tablet   AND NEEDED TO ASK THE DR WITT ABOUT THE MEDICATION DUE TO THAT IS WHO HE SEEN YESTERDAY AT THE OFFICE.  PLEASE LET THE PATIENT KNOW AS SOON AS POSSIBLE ON THE MEDICATION DUE TO HE IS GOING OUT OF TOWN TODAY AT 11 AM.    PLEASE CALL THE PATIENT.    Is it okay if the provider responds through MyChart:

## 2023-07-28 ENCOUNTER — ANTICOAGULATION VISIT (OUTPATIENT)
Dept: FAMILY MEDICINE CLINIC | Facility: CLINIC | Age: 53
End: 2023-07-28
Payer: COMMERCIAL

## 2023-07-28 DIAGNOSIS — Z86.718 HISTORY OF DVT (DEEP VEIN THROMBOSIS): Primary | ICD-10-CM

## 2023-07-28 LAB — INR PPP: 2.2 (ref 2–3)

## 2023-07-28 PROCEDURE — 85610 PROTHROMBIN TIME: CPT | Performed by: PHYSICIAN ASSISTANT

## 2023-07-28 PROCEDURE — 93793 ANTICOAG MGMT PT WARFARIN: CPT | Performed by: PHYSICIAN ASSISTANT

## 2023-07-28 PROCEDURE — 36416 COLLJ CAPILLARY BLOOD SPEC: CPT | Performed by: PHYSICIAN ASSISTANT

## 2023-11-17 NOTE — PROGRESS NOTES
"Chief Complaint  Follow up INR    Subjective        HPI   Darshan presents to [facility] for a follow up visit.     No bleeding issues. Missed a dose of Warfarin yesterday and did not take dose today. Otherwise takes regularly. No leg swelling or pain. Had difficulty getting labs scheduled at my old office.     HLD: Admits that healthy diet and exercise regimen non-existent.   Tons of starches and carbs.   10-15 beers at a time on the weekends (not daily drinker)        Objective   Vital Signs:   Vitals:    11/27/23 0845   BP: 140/88   BP Location: Left arm   Patient Position: Sitting   Cuff Size: Large Adult   Pulse: 71   SpO2: 98%   Weight: 121 kg (266 lb 3.2 oz)   Height: 180.3 cm (70.98\")                 Physical Exam  Constitutional:       General: He is not in acute distress.     Appearance: Normal appearance.   HENT:      Head: Normocephalic and atraumatic.   Eyes:      Conjunctiva/sclera: Conjunctivae normal.   Cardiovascular:      Rate and Rhythm: Normal rate.   Pulmonary:      Effort: Pulmonary effort is normal.   Musculoskeletal:         General: No swelling or deformity.   Skin:     Coloration: Skin is not jaundiced.      Findings: No rash.   Neurological:      General: No focal deficit present.      Mental Status: He is alert and oriented to person, place, and time.   Psychiatric:         Mood and Affect: Mood normal.         Behavior: Behavior normal.         Judgment: Judgment normal.          Result Review :     The following data was reviewed by: Susanne Richard MD on 11/27/2023:  Progress Notes by Jose Cruz Carter MD (07/06/2023 15:45)  Progress Notes by Erika Villar MA (07/28/2023 09:00)         Assessment and Plan    Diagnoses and all orders for this visit:    1. History of DVT (deep vein thrombosis) (Primary)  Assessment & Plan:  No bleeding issues. No s/s recurrent VTE. Told patient we need to make sure that he is coming in monthly for INR, pt expressed " understanding.  INR low at 1.4 today, no changes in diet recently, missed dose yesterday (although shouldn't see those effects on INR yet). Pt states he will take med daily and RTC within 2 weeks for INR. Order placed. If INR continues to be labile, will more strongly recommend switching to DOAC. He prefers warfarin.      Orders:  -     Cancel: Protime-INR  -     Protime-INR  -     POC INR    2. Chronic anticoagulation  -     Cancel: Protime-INR  -     Protime-INR  -     POC INR    3. Prothrombin gene mutation  -     Protime-INR  -     POC INR    4. Elevated blood pressure reading  Assessment & Plan:  BP up today. If this persists, will recommend medication. Discussed increasing exercise, cutting out starches, greatly reducing ETOH intake on the weekends, and increasing fibers/fruits/veggies. RTC within 3-4 mos for BP check and labs.       5. Other hyperlipidemia  Assessment & Plan:  See elevated BP section          Follow Up   Return in about 4 months (around 3/27/2024) for Annual physical.  Patient was given instructions and counseling regarding his condition or for health maintenance advice. Please see specific information pulled into the AVS if appropriate.

## 2023-11-20 NOTE — PROGRESS NOTES
Capillary Blood Specimen Collection  Capillary blood collection performed in 2nd finger by Erika Villar MA. Patient tolerated the procedure well without complications.   07/28/23   Erika Villar MA    Patient or patient's representative gives informed consent after an explanation of the risks (air embolism; catheter occlusion; phlebitis; catheter infection; bloodstream infection; vein thrombosis; hematoma/bleeding at the insertion site; slight discomfort; accidental puncture of an artery, nerve, or tendon; dislodging of device), benefits (longer dwell time, outpatient treatment, medications that cannot run peripherally), and alternatives (short peripheral catheter, centrally inserted central line, or permanent catheter) of PICC placement. Time provided to ask and answer questions.    Pt had 4 [place] single lumen PICC placed in left upper basilic vein. Pt tolerated procedure well. 44 cm of catheter internal and 1 cm external. Tip confirmation by 3CG. All lumens flush and draw well. Sterile dressing applied.

## 2023-11-27 ENCOUNTER — OFFICE VISIT (OUTPATIENT)
Dept: FAMILY MEDICINE CLINIC | Facility: CLINIC | Age: 53
End: 2023-11-27
Payer: COMMERCIAL

## 2023-11-27 VITALS
HEART RATE: 71 BPM | DIASTOLIC BLOOD PRESSURE: 88 MMHG | BODY MASS INDEX: 37.27 KG/M2 | SYSTOLIC BLOOD PRESSURE: 140 MMHG | OXYGEN SATURATION: 98 % | WEIGHT: 266.2 LBS | HEIGHT: 71 IN

## 2023-11-27 DIAGNOSIS — E78.49 OTHER HYPERLIPIDEMIA: ICD-10-CM

## 2023-11-27 DIAGNOSIS — R03.0 ELEVATED BLOOD PRESSURE READING: ICD-10-CM

## 2023-11-27 DIAGNOSIS — D68.52 PROTHROMBIN GENE MUTATION: ICD-10-CM

## 2023-11-27 DIAGNOSIS — Z79.01 CHRONIC ANTICOAGULATION: ICD-10-CM

## 2023-11-27 DIAGNOSIS — Z86.718 HISTORY OF DVT (DEEP VEIN THROMBOSIS): Primary | ICD-10-CM

## 2023-11-27 LAB — INR PPP: 1.4 (ref 0.9–1.1)

## 2023-11-27 NOTE — ASSESSMENT & PLAN NOTE
BP up today. If this persists, will recommend medication. Discussed increasing exercise, cutting out starches, greatly reducing ETOH intake on the weekends, and increasing fibers/fruits/veggies. RTC within 3-4 mos for BP check and labs.

## 2023-11-27 NOTE — ASSESSMENT & PLAN NOTE
No bleeding issues. No s/s recurrent VTE. Told patient we need to make sure that he is coming in monthly for INR, pt expressed understanding.  INR low at 1.4 today, no changes in diet recently, missed dose yesterday (although shouldn't see those effects on INR yet). Pt states he will take med daily and RTC within 2 weeks for INR. Order placed. If INR continues to be labile, will more strongly recommend switching to DOAC. He prefers warfarin.

## 2024-01-25 NOTE — TELEPHONE ENCOUNTER
Cardiovascular/Thoracic Surgery Transfer of Care:  1/25/2024    PCP: Severo Georges, MD    Cardiologist:  Nichole Carr    Pulmonologist:  Glo Diez    Requesting Physician:  Nichole Carr    Reason for Transfer of Care:  aortic valve disease    Chief Complaint:   mostly asymptomatic , minimal SOB with strenuous activities    Previous History:  SAMUEL c CPAP, Crohn's, anemia, HTN, moderate-severe mid-LAD myocardial bridging    History of Present Illness:  Marilyn Durham  is a 76years old male with PHX noted above presenting to office for evaluation of aortic stenosis, accompanied by his wife. Patient reports no significant symptoms. He is active at home without much limitations, able to hike, bike, and plays golf regularly. He reports some shortness of breath only after hiking and playing golf all day. He reports chronic occasional  dizziness/lightheadedness but relating this to his allergies. He denies chest pain, palpitation, syncope, orthopnea, and lower extremity edema. Patient is  active,  able to exercise without limitations. Marilyn Durham has had no heart failure admissions/ ER visits over the past year.     Past Medical History:    Irritable bowel syndrome                        8/27/2014     Crohn's disease (CMD)                           8/27/2014     Psoriasis, guttate                                            Rosacea                                                       Sleep apnea                                                   SAMUEL on CPAP                                     2/8/2016      Past Surgical History:    COLONOSCOPY                                     07/29/2011    COLONOSCOPY W/ BIOPSIES                         07/29/2011    NASAL FRACTURE SURGERY                                        GASTROSCOPY                                     2013 or 2*      Comment: stomach scope    TOOTH EXTRACTION                                07/2017         Comment: Preparation for implant    ALLERGIES:  No Known LOV - 03/29/22    LAST FILLED - 03/23/22    NEXT APPOINTMENT MA SCHEDULE - 07/15/22    LAST INR - 03/29/22   "Allergies    Current Outpatient Medications   Medication Sig   â¢ metoPROLOL tartrate (LOPRESSOR) 25 MG tablet Take 0.5 tablets by mouth every 12 hours. â¢ triamcinolone (ARISTOCORT) 0.1 % cream Apply to the affected area on the chest twice daily, use for 3 weeks with 1 week break, repeat as needed, avoid using on the face     No current facility-administered medications for this visit. Social History     Tobacco Use   Smoking Status Never   Smokeless Tobacco Never       Social History     Substance and Sexual Activity   Alcohol Use Not Currently   â¢ Alcohol/week: 7.0 standard drinks of alcohol   â¢ Types: 7 Glasses of wine per week    Comment: 1 drink/day       Social History     Substance and Sexual Activity   Drug Use No       Occupation: ?retired  Living Situation:  home with wife    Family History   Problem Relation Age of Onset   â¢ Stroke Mother    â¢ Crohn's Disease Mother    â¢ Psoriasis Mother    â¢ Cancer Father 54        lung +smoker       Review of Systems:    10 point Review of System obtained, all pertinent positives stated above in HPI ( History of Present Illness), otherwise negative     Physical Exam:    Visit Vitals  BP (!) 156/90   Pulse 66   Resp 16   Ht 5' 9"" (1.753 m)   Wt 76 kg (167 lb 8.8 oz)   SpO2 96%   BMI 24.74 kg/mÂ²     Ht Readings from Last 1 Encounters:   01/25/24 5' 9\"" (1.753 m)     Wt Readings from Last 1 Encounters:   01/25/24 76 kg (167 lb 8.8 oz)     Body mass index is 24.74 kg/mÂ². Physical Exam:    GENERAL:  Well developed, well nourished, in no acute distress. HEENT:  Head normocephalic, atraumatic, sclerae anicteric. LAST DENTAL VISIT:  up to date, 5 years ago   NECK:  Negative for JVD (jugular venous distension). Trachea midline. CHEST:  Non-labored breathing. CARDIOVASCULAR:  No edema. INTEGUMENTARY:  Skin warm and dry. MUSCULOSKELETAL:  Patient station normal.  No cyanosis or clubbing. Lower extremities are normal to inspection.    NEUROLOGIC/PSYCHIATRIC:  " Alert and oriented x 3. Mood and affect normal.       Labs:    Lab Results   Component Value Date    SODIUM 139 01/02/2024    POTASSIUM 4.1 01/02/2024    CHLORIDE 105 01/02/2024    CO2 28 01/02/2024    GLUCOSE 98 01/02/2024    BUN 16 01/02/2024    CREATININE 0.94 01/02/2024    CALCIUM 8.9 01/02/2024    ALBUMIN 2.9 (L) 01/02/2024    BILIRUBIN 0.2 01/02/2024    AST 19 01/02/2024     Lab Results   Component Value Date    WBC 5.7 01/02/2024    HGB 12.1 (L) 01/02/2024    HCT 40.5 01/02/2024     01/02/2024    TSH 1.598 09/28/2011       Diagnostics:    TTE- 12/14/2023  Final Impressions    * Normal LV size, mildly increased wall thickness, normal systolic function,  EF 86%. * Grade I diastolic dysfunction of the left ventricle (impaired relaxation  pattern). * Normal right ventricular size and systolic function. * Normal left atrial chamber size. * Normal right atrial chamber size. * Possible bicuspid aortic valve with left/right cusp fusion, sclerotic,  severe stenosis (V max 4 m/sec, mean gradient 29 mmHg, DVI 0.21), mild  regurgitation. * Mild tricuspid valve regurgitation. * Right ventricular systolic pressure; 28 mmHg. * Aortic root 4 cm, ascending aorta 4.1 cm. * No pericardial effusion. Coronary angiogram- 1/8/2024  No angiographic evidence of atherosclerotic coronary artery disease.     Moderate to severe mid LAD myocardial bridging   Aortic root: No aneurysm   Distal abdominal aorta:  No stenosis or aneurysm   Bilateral common iliac arteries:  Patent  Bilateral external iliac arteries:  Patent  Bilateral internal iliac arteries:  Patent  Bilateral common femoral arteries: Patent    Encounter Date: 12/21/23   Electrocardiogram 12-Lead   Result Value    Ventricular Rate EKG/Min (BPM) 70    Atrial Rate (BPM) 70    VA-Interval (MSEC) 182    QRS-Interval (MSEC) 106    QT-Interval (MSEC) 400    QTc 432    P Axis (Degrees) 56    R Axis (Degrees) -31    T Axis (Degrees) -8    REPORT "TEXT      Normal sinus rhythm  Possible  Left atrial enlargement  Left axis deviation  Incomplete right bundle branch block  Left ventricular hypertrophy  Abnormal ECG  No previous ECGs available  Confirmed by Nik CALZADA MD ([id number]) on 12/21/2023 10:21:36 PM           STS:  0.633  NYHA:  1    5 meter Walk:  5/5/5      FRAILTY    Chair Raises:  Five Chair Raises less than 15 seconds = 0 Points  Five Chair Raises greater than 15 seconds = 1 Point  Unable to Complete = 2 Point            Score:  0      Cognitive Impairment:  No Cognitive Impairment = 0 Points  Cognitive Impairment = 1 Point             Score:  0    Hemoglobin:  Men  >/= 13.0 g/dL = 0 Points  < 13.0 g/dL = 1 Point  Women  >/= 12.0 g/dL = 0 Points  < 12.0 g/dL = 1 Point          Score:  1    Albumin:  >/= 3.5 g/dL = 0 Points  < 3.5 g/dL = 1 Point                                                                             Score:  1      TOTAL:  2        Impression/Plan:  Severe Degenerative Bicuspid Aortic Stenosis     Perla Tyler is low risk for SAVR. Imaging reviewed with Dr. Ramy Rosado,   Discussed 3 different options:  1) Surveillance due to not many symptoms but because he is very active he would be at increased risk for sudden death if he continues his current lifestyle, therefore this is not recommended  2) TAVR- due to his valve being bicuspid- this is not the best fit for his valve and he is at increased risk of PVL  3) minimally invasive left thoracotomy approach ""TISSUE\"" AVR. This is the best treatment at his age and valve type for his AS    Pt has agreed to proceed with SAVR. We did discuss that down the road he may be eligible for TAVR in SAVR if this valve wears down. In the mean time we are recommending to avoid strenuous exercises/ activities. We have discussed the operation with the patient including risks such as death and stroke, expected course recovery and approach.      I thank Dr. Marylee Ice  very much for the " consultation. Risks, benefits, and alternatives were discussed with the patient and he agrees to proceed. STS risk calculator score was calculated and discussed with the patient/family prior to surgery as documented in the medical record     As part of shared decision making the above mentioned procedure (TAVR, CT Heart Structure, Angiogram Abdomen & Pelvis) including the details, risks, benefits and alternatives to the procedure were discussed in detail with the patient. The STS risk calculator was used and determined score was discussed with patient and documented in medical record. The patient has been provided verbal and written education, has had the opportunity to ask questions, is in agreement, and wishes to proceed. Patient will be entered into TVT registry and follow its protocol per Medicare guidelines. Patient to be entered into the TVT registry. TVT registry Z00.6 CT [id number]. Patient does  wish to receive blood products if necessary.       On 1/25/2024, IMo APNP scribed the services personally performed by Andres Persaud MD      CC:  Yaquelin Arias

## 2024-02-28 ENCOUNTER — TELEPHONE (OUTPATIENT)
Dept: FAMILY MEDICINE CLINIC | Facility: CLINIC | Age: 54
End: 2024-02-28
Payer: COMMERCIAL

## 2024-02-28 DIAGNOSIS — Z86.718 HISTORY OF RECURRENT DEEP VEIN THROMBOSIS (DVT): ICD-10-CM

## 2024-02-28 RX ORDER — TADALAFIL 10 MG/1
10 TABLET ORAL DAILY PRN
Qty: 30 TABLET | Refills: 7 | Status: CANCELLED | OUTPATIENT
Start: 2024-02-28

## 2024-02-28 RX ORDER — TADALAFIL 10 MG/1
10 TABLET ORAL DAILY PRN
Qty: 30 TABLET | Refills: 0 | OUTPATIENT
Start: 2024-02-28

## 2024-02-28 RX ORDER — WARFARIN SODIUM 5 MG/1
10 TABLET ORAL DAILY
Qty: 180 TABLET | Refills: 3 | Status: CANCELLED | OUTPATIENT
Start: 2024-02-28

## 2024-02-28 NOTE — TELEPHONE ENCOUNTER
Caller: [place]Darshan    Relationship: Self    Best call back number: [phone]     Requested Prescriptions:   Requested Prescriptions     Pending Prescriptions Disp Refills    warfarin (COUMADIN) 5 MG tablet 180 tablet 3     Sig: Take 2 tablets by mouth Daily.    tadalafil (CIALIS) 10 MG tablet 30 tablet 7     Sig: Take 1 tablet by mouth Daily As Needed for Erectile Dysfunction.        Pharmacy where request should be sent: [address] [phone] [facility] [phone]      Last office visit with prescribing clinician: 11/27/2023   Last telemedicine visit with prescribing clinician: Visit date not found   Next office visit with prescribing clinician: 3/20/2024     Additional details provided by patient:     Does the patient have less than a 3 day supply:  [x] Yes  [] No    Would you like a call back once the refill request has been completed: [] Yes [] No    If the office needs to give you a call back, can they leave a voicemail: [] Yes [] No    Ronny Centeno Rep   02/28/24 14:34 EST

## 2024-03-13 ENCOUNTER — TELEPHONE (OUTPATIENT)
Dept: FAMILY MEDICINE CLINIC | Facility: CLINIC | Age: 54
End: 2024-03-13
Payer: COMMERCIAL

## 2024-03-13 DIAGNOSIS — Z86.718 HISTORY OF RECURRENT DEEP VEIN THROMBOSIS (DVT): ICD-10-CM

## 2024-03-13 NOTE — TELEPHONE ENCOUNTER
Caller: Darshan Frias    Relationship to patient: Self    Best call back number: [phone]     Patient is needing: PATIENT IS CALLING FOR AN UPDATE ON THE warfarin (COUMADIN) 5 MG tabletMEDICATION REFILL. PATIENT STATED HE CAN TELL A DIFFERENCE NOW THAT HE'S BEEN  NOT TAKING THE MEDICATION     PLEASE ADVISE

## 2024-03-14 ENCOUNTER — TELEPHONE (OUTPATIENT)
Dept: FAMILY MEDICINE CLINIC | Facility: CLINIC | Age: 54
End: 2024-03-14
Payer: COMMERCIAL

## 2024-03-14 DIAGNOSIS — Z79.01 CHRONIC ANTICOAGULATION: Primary | ICD-10-CM

## 2024-03-14 DIAGNOSIS — D68.9 CLOTTING DISORDER: ICD-10-CM

## 2024-03-14 DIAGNOSIS — Z86.718 HISTORY OF DVT (DEEP VEIN THROMBOSIS): ICD-10-CM

## 2024-03-14 RX ORDER — WARFARIN SODIUM 5 MG/1
10 TABLET ORAL DAILY
Qty: 180 TABLET | Refills: 3 | Status: CANCELLED | OUTPATIENT
Start: 2024-03-14

## 2024-03-14 NOTE — TELEPHONE ENCOUNTER
I spoke with the patient and explained the dangers of his levels going below therapeutic levels as this could possible causes a stoke or etc. Patient is aware if he has any SOB, Leg swelling an etc. he needs to be seen in the ER. Dr. Richard will send in a RX for lovenox on Friday, and he is to come back on Monday for an INR, and Friday. The INR will need to be checked until his levels are back at therapeutic level. Once he is at a therapeutic level the RX for Warfarin will be sent in. Dr. Richard is referring him to hematology to monition his levels going forward. Dr. Richard will continue monitoring his INR as a temporally basis

## 2024-03-14 NOTE — TELEPHONE ENCOUNTER
I was notified that pt is out of his warfarin since 2/28. He has been non-compliant with INR checks. At his last visit, he was told to get INR checked within 2 weeks since his INR subtherapeutic. He did not. I have told him at prior visits he needs to get INRs checked at least monthly.    Please notify the pt:    If worsening leg pain/swelling or shortness of breath, needs evaluation in the ER.    Since he has been off his warfarin, he will need to be started on Lovenox injections until his Warfarin level is therapeutic again. This will likely require more than one INR check. Alternatively, we can start pt on a blood thinner like Eliquis or Xarelto which does not require blood checks. Please let me know what he prefers to do. I will be referring the pt to hematology to discuss recommendations for ongoing anticoagulation. If pt and hematologist believe Warfarin is the best choice for the pt, then the hematology service would be able to monitor and dose the Warfarin. If he is going to stay on warfarin, he HAS to come in for INR checks as directed.

## 2024-03-15 ENCOUNTER — TELEPHONE (OUTPATIENT)
Dept: PHARMACY | Facility: HOSPITAL | Age: 54
End: 2024-03-15
Payer: COMMERCIAL

## 2024-03-15 ENCOUNTER — TELEPHONE (OUTPATIENT)
Dept: FAMILY MEDICINE CLINIC | Facility: CLINIC | Age: 54
End: 2024-03-15
Payer: COMMERCIAL

## 2024-03-15 ENCOUNTER — ANTICOAGULATION VISIT (OUTPATIENT)
Dept: PHARMACY | Facility: HOSPITAL | Age: 54
End: 2024-03-15
Payer: COMMERCIAL

## 2024-03-15 ENCOUNTER — DOCUMENTATION (OUTPATIENT)
Dept: FAMILY MEDICINE CLINIC | Facility: CLINIC | Age: 54
End: 2024-03-15
Payer: COMMERCIAL

## 2024-03-15 DIAGNOSIS — Z86.718 HISTORY OF DVT (DEEP VEIN THROMBOSIS): ICD-10-CM

## 2024-03-15 DIAGNOSIS — Z86.718 HISTORY OF RECURRENT DEEP VEIN THROMBOSIS (DVT): ICD-10-CM

## 2024-03-15 DIAGNOSIS — D68.9 CLOTTING DISORDER: Primary | ICD-10-CM

## 2024-03-15 DIAGNOSIS — D68.52 PROTHROMBIN GENE MUTATION: ICD-10-CM

## 2024-03-15 RX ORDER — ENOXAPARIN SODIUM 150 MG/ML
110 INJECTION SUBCUTANEOUS EVERY 12 HOURS SCHEDULED
Qty: 8 ML | Refills: 0 | Status: SHIPPED | OUTPATIENT
Start: 2024-03-15

## 2024-03-15 RX ORDER — WARFARIN SODIUM 5 MG/1
10 TABLET ORAL DAILY
Qty: 14 TABLET | Refills: 0 | Status: SHIPPED | OUTPATIENT
Start: 2024-03-15 | End: 2024-03-18 | Stop reason: SDUPTHER

## 2024-03-15 NOTE — PROGRESS NOTES
"Spoke to pt today about lapse in AC. He didn't realize he was running low until end of February. Unable to get a hold of me in our office earlier this month (task was not routed to me). States he's been off Warfarin since March 1. I asked him what happened, why he didn't get INRs checked as recommended. He said \"that's all me\" in terms of not getting it done. States that he knows that if he takes 10 mg Warfarin, his INR will be 2-3 at all times. He hesitates to change to DOAC as he feels Warfarin works for him. I spoke to our anticoagulation pharmacist Clarisa. Anticoag clinic will be managing his warfarin dosing going forward. Heme consult has been placed. In the meantime, I sent in a Lovenox bridge 110 mg bid and Warfarin 10 mg daily. That should last him until early next week at which point our anticoagulation pharmacist will make dose adjustments, if needed.   "

## 2024-03-15 NOTE — PROGRESS NOTES
Closing out this episode to allow for new referral to [facility] anticoagulation clinic.

## 2024-03-15 NOTE — TELEPHONE ENCOUNTER
Called patient regarding new referral for warfarin management.  He confirmed that he typically takes warfarin 10 mg daily and that he has been taking warfarin a long time until recently when he ran out of refills around March 1st. He is going to bridge with lovenox until INR is therapeutic and endorsed that he has used the injections previously and was well versed on the administration procedure.  I scheduled him for an initial visit on march 18th, and we will plan to review warfarin counseling points.  He has a [facility] near his home and I offered lab, however he preferred to come to clinic at the present time.

## 2024-03-15 NOTE — TELEPHONE ENCOUNTER
[facility] calling in regard to a urgent referral that was sent over to them. They are unable to proceed with referral due to pt having no documentation of DVT in his chart which is listed in his referral. Also pt has not had labs done since march of last year and has not been evaluated by pcp since November. Did inform [facility] pt is scheduled for 3/20/24 but these are needed before referral.

## 2024-03-18 ENCOUNTER — ANTICOAGULATION VISIT (OUTPATIENT)
Dept: PHARMACY | Facility: HOSPITAL | Age: 54
End: 2024-03-18
Payer: COMMERCIAL

## 2024-03-18 DIAGNOSIS — D68.9 CLOTTING DISORDER: Primary | ICD-10-CM

## 2024-03-18 DIAGNOSIS — Z86.718 HISTORY OF DVT (DEEP VEIN THROMBOSIS): ICD-10-CM

## 2024-03-18 DIAGNOSIS — D68.52 PROTHROMBIN GENE MUTATION: ICD-10-CM

## 2024-03-18 DIAGNOSIS — Z86.718 HISTORY OF RECURRENT DEEP VEIN THROMBOSIS (DVT): ICD-10-CM

## 2024-03-18 LAB
INR PPP: 1.6 (ref 0.91–1.09)
PROTHROMBIN TIME: 19 SECONDS (ref 10–13.8)

## 2024-03-18 PROCEDURE — 85610 PROTHROMBIN TIME: CPT

## 2024-03-18 PROCEDURE — G0463 HOSPITAL OUTPT CLINIC VISIT: HCPCS

## 2024-03-18 PROCEDURE — 36416 COLLJ CAPILLARY BLOOD SPEC: CPT

## 2024-03-18 RX ORDER — WARFARIN SODIUM 5 MG/1
10 TABLET ORAL DAILY
Qty: 60 TABLET | Refills: 0 | Status: SHIPPED | OUTPATIENT
Start: 2024-03-18 | End: 2024-04-17

## 2024-03-18 NOTE — PROGRESS NOTES
I have supervised and reviewed the notes, assessments, and/or procedures performed. The documented assessment and plan were developed cooperatively. I concur with the documentation of this patient encounter.    Josiah Jesus, PharmD

## 2024-03-18 NOTE — PROGRESS NOTES
Anticoagulation Clinic Progress Note  Anticoagulation Summary  As of 3/18/2024      INR goal:  2.0-3.0   TTR:  --   INR used for dosin.6 (3/18/2024)   Warfarin maintenance plan:  10 mg every day   Weekly warfarin total:  70 mg   Plan last modified:  Pearl Earl RPH (3/18/2024)   Next INR check:  3/20/2024   Target end date:  Indefinite    Indications    Clotting disorder [D68.9]  Prothrombin gene mutation [D68.52]  History of DVT (deep vein thrombosis) [Z86.718]                 Anticoagulation Episode Summary       INR check location:      Preferred lab:      Send INR reminders to:   [facility] The[facility]    Comments:  Referred by PCP for nonadherence to warfarin (out since first ). Hx of multi DVTs (most current in  while off warfarin for a prolonged period), unspecified genetic clotting disorder. Currently bridging -Scheduled for INR Monday.           Anticoagulation Care Providers       Provider Role Specialty Phone number    Susanne Richard MD Referring Internal Medicine [phone]            Clinic Interview:  Patient Findings     Positives:  Change in alcohol use, Change in medications    Negatives:  Signs/symptoms of thrombosis, Signs/symptoms of bleeding,   Laboratory test error suspected, Change in health, Change in activity,   Upcoming invasive procedure, Emergency department visit, Upcoming dental   procedure, Missed doses, Extra doses, Change in diet/appetite, Hospital   admission, Bruising, Other complaints    Comments:  Patient reports he consumes roughly 4-6 alcoholic drinks   weekly, and utilizes Tylenol as OTC medication when needed. He confirmed   that he typically takes warfarin 10 mg daily and that he has been taking   warfarin a long time until recently when he ran out of refills around   . On 3/14/24, Dr. Richard advised him to resume warfarin 10 mg   daily alongside enoxaparin bridge. He has had difficulty finding a   pharmacy that had the enoxaparin in  stock, but he indicated he will be   able to  enoxaparin today.       Clinical Outcomes     Negatives:  Major bleeding event, Thromboembolic event,   Anticoagulation-related hospital admission, Anticoagulation-related ED   visit, Anticoagulation-related fatality    Comments:  Patient reports he consumes roughly 4-6 alcoholic drinks   weekly, and utilizes Tylenol as OTC medication when needed. He confirmed   that he typically takes warfarin 10 mg daily and that he has been taking   warfarin a long time until recently when he ran out of refills around   . On 3/14/24, Dr. Richard advised him to resume warfarin 10 mg   daily alongside enoxaparin bridge. He has had difficulty finding a   pharmacy that had the enoxaparin in stock, but he indicated he will be   able to  enoxaparin today.         Education:  Darshan Frias is a new start in the [facility]. We discussed the followin) Warfarin's indication, mechanism, and dosing  2) Enforced the importance of taking warfarin as instructed and at the same time every day, preferably in the evening so that we can make dose adjustments more easily following subsequent clinic visits  3) What he should do about a missed dose; pts can take missed doses within about 12 hours of their usual scheduled dose, but he was instructed on the importance of not doubling up on doses unless told to do so by the [facility]  4) Explained possible side effects of warfarin therapy, including increased risk of bleeding, s/sx of bleeding and s/sx of any additional clots/PE/CVA.   5) Discussed monitoring of warfarin, the INR, goal INR range, and the frequency of monitoring  6) Reviewed drug/food/tobacco/EtOH interactions and provided written information covering these topics in more detail, explaining that green, leafy vegetables interact most heavily with warfarin  7) Instructed the pt not to take or discontinue any medications without  informing his physician/pharmacist and reminded him to inform us of any dietary changes, as well  8) Explained that he would be coming into the clinic more frequently in these first few weeks of therapy as we try to adjust his dose and achieve a therapeutic INR x 2 consecutive readings. Once that is achieved, patient will follow up in clinic every 4 weeks, on average.    He stated no problems with transportation or scheduling clinic appts in this manner. he expressed understanding of the information provided and has no additional questions at this time.    Darshan Frias was presented with a copy of the Patients Rights and Responsibilities. he expressed verbal consent and agreement to receive care in the [facility] under the current collaborative care agreement with [facility].       INR History:      11/28/2022     8:15 AM 12/12/2022     8:00 AM 1/11/2023     2:45 PM 3/20/2023     1:45 PM 7/28/2023     9:00 AM 3/15/2024    12:36 PM 3/18/2024     9:30 AM   Anticoagulation Monitoring   INR 1.70 2.80  2.80 2.20  1.6   INR Date 11/28/2022 12/12/2022  3/20/2023 7/28/2023  3/18/2024   INR Goal 2.0-3.0 2.0-3.0 2.0-3.0 2.0-3.0 2.0-3.0 2.0-3.0 2.0-3.0   Trend     Same Same    Last Week Total 0 mg 0 mg 0 mg 0 mg 70 mg 70 mg 40 mg   Next Week Total 0 mg 0 mg 0 mg 70 mg 70 mg 0 mg 70 mg   Sun - - - 10 mg 10 mg - -   Mon - - - 10 mg 10 mg - 10 mg   Tue - - - 10 mg 10 mg - 10 mg   Wed - - - 10 mg 10 mg - -   Thu - - - 10 mg 10 mg - -   Fri - - - 10 mg 10 mg - -   Sat - - - 10 mg 10 mg - -   Visit Report   Report Report          Plan:  1. INR is Subtherapeutic today- see above in Anticoagulation Summary.   Will instruct Darshan Frias to Continue their warfarin regimen of 10 mg daily, and bridge with lovenox 105 mg subcutaneous injection every 12 hours until in therapeutic range-see above in Anticoagulation Summary. He is familiar and comfortable with enoxaparin use.   2. Follow up in 2 days at  [place] lab. Entered standing INR order.   3. Patient desires warfarin refills.  4. Verbal and written information provided. Patient expresses understanding and has no further questions at this time.    Pearl Earl [facility]

## 2024-03-19 ENCOUNTER — LAB (OUTPATIENT)
Dept: LAB | Facility: HOSPITAL | Age: 54
End: 2024-03-19
Payer: COMMERCIAL

## 2024-03-19 ENCOUNTER — CONSULT (OUTPATIENT)
Dept: ONCOLOGY | Facility: CLINIC | Age: 54
End: 2024-03-19
Payer: COMMERCIAL

## 2024-03-19 VITALS
TEMPERATURE: 98 F | SYSTOLIC BLOOD PRESSURE: 130 MMHG | BODY MASS INDEX: 36.23 KG/M2 | OXYGEN SATURATION: 95 % | HEART RATE: 71 BPM | HEIGHT: 71 IN | DIASTOLIC BLOOD PRESSURE: 86 MMHG | RESPIRATION RATE: 18 BRPM | WEIGHT: 258.8 LBS

## 2024-03-19 DIAGNOSIS — D68.52 PROTHROMBIN GENE MUTATION: Primary | ICD-10-CM

## 2024-03-19 DIAGNOSIS — Z86.711 HISTORY OF PULMONARY EMBOLISM: ICD-10-CM

## 2024-03-19 DIAGNOSIS — D68.9 CLOTTING DISORDER: ICD-10-CM

## 2024-03-19 DIAGNOSIS — Z86.718 HISTORY OF DVT (DEEP VEIN THROMBOSIS): ICD-10-CM

## 2024-03-19 DIAGNOSIS — Z79.01 CHRONIC ANTICOAGULATION: ICD-10-CM

## 2024-03-19 DIAGNOSIS — D68.9 CLOTTING DISORDER: Primary | ICD-10-CM

## 2024-03-19 LAB
BASOPHILS # BLD AUTO: 0.11 10*3/MM3 (ref 0–0.2)
BASOPHILS NFR BLD AUTO: 1.2 % (ref 0–1.5)
DEPRECATED RDW RBC AUTO: 39.8 FL (ref 37–54)
EOSINOPHIL # BLD AUTO: 0.57 10*3/MM3 (ref 0–0.4)
EOSINOPHIL NFR BLD AUTO: 6.2 % (ref 0.3–6.2)
ERYTHROCYTE [DISTWIDTH] IN BLOOD BY AUTOMATED COUNT: 12.4 % (ref 12.3–15.4)
HCT VFR BLD AUTO: 45.4 % (ref 37.5–51)
HGB BLD-MCNC: 16 G/DL (ref 13–17.7)
IMM GRANULOCYTES # BLD AUTO: 0.04 10*3/MM3 (ref 0–0.05)
IMM GRANULOCYTES NFR BLD AUTO: 0.4 % (ref 0–0.5)
LYMPHOCYTES # BLD AUTO: 2.54 10*3/MM3 (ref 0.7–3.1)
LYMPHOCYTES NFR BLD AUTO: 27.7 % (ref 19.6–45.3)
MCH RBC QN AUTO: 30.9 PG (ref 26.6–33)
MCHC RBC AUTO-ENTMCNC: 35.2 G/DL (ref 31.5–35.7)
MCV RBC AUTO: 87.6 FL (ref 79–97)
MONOCYTES # BLD AUTO: 0.55 10*3/MM3 (ref 0.1–0.9)
MONOCYTES NFR BLD AUTO: 6 % (ref 5–12)
NEUTROPHILS NFR BLD AUTO: 5.37 10*3/MM3 (ref 1.7–7)
NEUTROPHILS NFR BLD AUTO: 58.5 % (ref 42.7–76)
NRBC BLD AUTO-RTO: 0 /100 WBC (ref 0–0.2)
PLATELET # BLD AUTO: 248 10*3/MM3 (ref 140–450)
PMV BLD AUTO: 9.6 FL (ref 6–12)
RBC # BLD AUTO: 5.18 10*6/MM3 (ref 4.14–5.8)
WBC NRBC COR # BLD AUTO: 9.18 10*3/MM3 (ref 3.4–10.8)

## 2024-03-19 PROCEDURE — 36415 COLL VENOUS BLD VENIPUNCTURE: CPT

## 2024-03-19 PROCEDURE — 85025 COMPLETE CBC W/AUTO DIFF WBC: CPT

## 2024-03-19 PROCEDURE — 99204 OFFICE O/P NEW MOD 45 MIN: CPT | Performed by: INTERNAL MEDICINE

## 2024-03-19 RX ORDER — CHLORHEXIDINE GLUCONATE ORAL RINSE 1.2 MG/ML
SOLUTION DENTAL
COMMUNITY
Start: 2024-03-04 | End: 2024-03-19

## 2024-03-19 RX ORDER — HYDROCODONE BITARTRATE AND ACETAMINOPHEN 5; 325 MG/1; MG/1
TABLET ORAL SEE ADMIN INSTRUCTIONS
COMMUNITY
Start: 2024-03-04 | End: 2024-03-19

## 2024-03-19 RX ORDER — AMOXICILLIN 500 MG/1
1 CAPSULE ORAL 3 TIMES DAILY
COMMUNITY
Start: 2024-03-04 | End: 2024-03-19

## 2024-03-19 NOTE — PROGRESS NOTES
.     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 The patient is on lifelong anticoagulation with Coumadin.                              REQUESTING PHYSICIAN: Susanne Richard MD      RECORDS OBTAINED:  Records of the patient's history including those obtained from the referring provider were reviewed and summarized in detail.    HISTORY OF PRESENT ILLNESS:  The patient is a 54 y.o. year old male who presents for evaluation of clotting disorder.    In summary, the patient presented today on 03/19/2024 for evaluation because of history of recurrent DVT and prothrombin gene mutation. I saw this patient 10 years ago; his last visit was 03/17/2014. The patient reports he has been taking Coumadin with good compliance, however, recently because of lapse of the prescription, he was off Coumadin for several days, close to about 1 week to 10 days. He came here today for evaluation to manage his anticoagulation.     The patient reports no melena, hematochezia, or other bleeding while on Coumadin therapy. He does come in for monitoring of INR usually monthly.     The patient reports no swelling or pains in the right leg where he had DVT twice previously.     The patient corrected today on 3/19/2024 that he had first episode of DVT in the right leg somewhere in 2003 or 2004 instead of 2008. Nevertheless, he is not 100% sure about the timing and I do not think that matters. The thing that matters is that he had factor II/prothrombin gene mutation and had recurrent DVT in the right leg. This patient needs to be on lifelong anticoagulation.    Past Medical History:   Diagnosis Date    Deep vein thrombosis     History of erectile dysfunction      Past Surgical History:   Procedure Laterality Date    COLONOSCOPY  2020    Dr. Ernst       HEMATOLOGY HISTORY: Mr. Frias was a 43-year-old  male who presented on  5/7/2020 for initial evaluation because of recurrent right leg deep venous thrombosis and was diagnosed with some sort of hypercoagulable status for which I do not know the details even after I reviewed his previ  ous medical records from Dr. Luna's office.          This patient had recurrent right leg edema in late April 2013 and he had venous duplex study on 4/22/13 which reported complete occlusion of the right superficial femoral vein and also popliteal vein.  Since   that time he has been taking Coumadin religiously and most recent outside lab 2/29/13 showed INR 1.6.  The patient reports no bleeding episodes. No Bruising.  He otherwise is at baseline condition with good performance status ECOG 0.          After the patient left, I received more medical records from Dr. Luna's office.  The venous duplex study dated 1/31/08 reported a venous thrombosis in the right popliteal vein and peroneal vein.  The thrombus was not occlusive.  Other veins were clear of thrombosis.    I do not see lab study results for hypercoagulable status.  The clinic noted dated 3/31/08 categorized this, patient has primary hypercoagulable status but there was no mention of the abnormalities of the laboratory results.  The notes on 3/10/08 did not   mention neither.  Clinic notes 10/26/12 did not mention the abnormalities, only described that this patient needed long-term Coumadin anticoagulation.          According to the patient after the initial diagnosis in January 2008, he was taking not compliant and he did not followup as required for PT/INR monitoring at Dr. Luna'  s.  Eventually he was fired in the end of 2012 and he did not have a primary care physician and it sounds like he was not taking the Coumadin properly.    Since late April 2013, he had sign  ificant swelling of the left leg including the thigh area.  He had venous duplex study reporting occlusive right superficial femoral vein thrombosis and right popliteal  vein thrombosis.  Since that time he has been taking Coumadin religiously according to     him.  His current dose is 10 mg and 7.5 mg alternating every other day.  Today his INR is 2.6.  I instructed him to take Coumadin 10 mg on Tuesday, Thursdays and Saturdays and the rest of the days taking 7.5 mg and he will have repeat labs next week at th  e time of our evaluation. In the meantime, I will request complete hypercoagulable status workup for evaluation.           Lab studies obtained on 05/07/13 reported heterozygous factor II [id number] mutation, negative for factor V Leiden mutation.  The patient also   tested negative for anticardiolipin antibodies and antiphosphatidylserine antibodies, negative for lupus anticoagulant and anti-beta 2 GPI antibodies.  Antithrombin-III activity was 104%.  The patient had depressed protein C activity of 16%, depressed pro  tein S activity of 49%, and protein S free antigen 50%.  The depressed protein C and protein S activity levels likely are related to Coumadin therapy.              MEDICATIONS    Current Outpatient Medications:     acyclovir (ZOVIRAX) 400 MG tablet, TAKE 1 TABLET BY MOUTH THREE TIMES DAILY FOR 5 DAYS (FOR COLD SORE OUTBREAK), Disp: , Rfl:     Enoxaparin Sodium (LOVENOX) 120 MG/0.8ML solution prefilled syringe syringe, Inject 0.7 mL under the skin into the appropriate area as directed Every 12 (Twelve) Hours., Disp: 8 mL, Rfl: 0    warfarin (COUMADIN) 5 MG tablet, Take 2 tablets by mouth Daily for 30 days. Take 2 tablets (10 mg) by mouth daily or as directed, Disp: 60 tablet, Rfl: 0    apixaban (ELIQUIS) 2.5 MG tablet tablet, Take 1 tablet by mouth 2 (Two) Times a Day., Disp: 60 tablet, Rfl: 5    ALLERGIES:   No Known Allergies    SOCIAL HISTORY:       Social History     Socioeconomic History    Marital status:      Spouse name: Ale   Tobacco Use    Smoking status: Never    Smokeless tobacco: Never   Vaping Use    Vaping status: Never Used   Substance  "and Sexual Activity    Alcohol use: Yes     Alcohol/week: 10.0 standard drinks of alcohol     Types: 10 Cans of beer per week     Comment: occasionally    Drug use: Never    Sexual activity: Yes     Partners: Female         FAMILY HISTORY:  No family history on file.    REVIEW OF SYSTEMS:  Review of Systems   Constitutional:  Negative for unexpected weight change.   HENT:  Negative for hearing loss, mouth sores and sore throat.    Respiratory:  Negative for cough and shortness of breath.    Cardiovascular:  Negative for chest pain and leg swelling.   Gastrointestinal:  Negative for abdominal pain, anal bleeding and blood in stool.   Genitourinary:  Negative for hematuria.   Musculoskeletal:  Negative for joint swelling.   Skin:  Negative for color change.   Allergic/Immunologic: Negative for immunocompromised state.   Hematological:  Negative for adenopathy.   Psychiatric/Behavioral:  Negative for confusion.               Vitals:    03/19/24 0837   BP: 130/86   Pulse: 71   Resp: 18   Temp: 98 °F (36.7 °C)   TempSrc: Temporal   SpO2: 95%   Weight: 117 kg (258 lb 12.8 oz)   Height: 180.3 cm (70.98\")   PainSc: 0-No pain         3/19/2024     8:30 AM   Current Status   ECOG score 0      PHYSICAL EXAM:      CONSTITUTIONAL:  Vital signs reviewed.  No distress, looks comfortable.  EYES:  Conjunctivae and lids unremarkable.  PERRLA  EARS,NOSE,MOUTH,THROAT:  Ears and nose appear unremarkable.  Lips appear unremarkable.  RESPIRATORY:  Normal respiratory effort.  Lungs clear to auscultation bilaterally.  CARDIOVASCULAR:  Normal S1, S2.  No murmurs rubs or gallops.  No significant lower extremity edema.  GASTROINTESTINAL: Abdomen appears unremarkable.  Nontender.  No hepatomegaly.  No splenomegaly.  LYMPHATIC:  No cervical, supraclavicular, axillary lymphadenopathy.  MUSCULOSKELETAL:  Unremarkable gait and station.  Unremarkable digits/nails.  No cyanosis or clubbing.  SKIN:  Warm.  No rashes.  PSYCHIATRIC:  Normal judgment and " insight.  Normal mood and affect.      RECENT LABS:        WBC   Date Value Ref Range Status   03/19/2024 9.18 3.40 - 10.80 10*3/mm3 Final   03/20/2023 10.64 3.40 - 10.80 10*3/mm3 Final   03/29/2022 7.4 3.4 - 10.8 x10E3/uL Final   02/22/2021 8.71 4.5 - 11.0 10*3/uL Final   01/14/2020 9.51 4.5 - 11.0 10*3/uL Final   07/23/2018 10.3 4.5 - 11.0 10*3/uL Final     Hemoglobin   Date Value Ref Range Status   03/19/2024 16.0 13.0 - 17.7 g/dL Final   03/20/2023 16.5 13.0 - 17.7 g/dL Final   03/29/2022 15.6 13.0 - 17.7 g/dL Final   02/22/2021 15.3 13.5 - 17.5 g/dL Final   01/14/2020 15.9 13.5 - 17.5 g/dL Final   07/23/2018 15.6 13.5 - 17.5 g/dL Final     Platelets   Date Value Ref Range Status   03/19/2024 248 140 - 450 10*3/mm3 Final   03/20/2023 259 140 - 450 10*3/mm3 Final   03/29/2022 253 150 - 450 x10E3/uL Final   02/22/2021 280 140 - 440 10*3/uL Final   01/14/2020 289 140 - 440 10*3/uL Final   07/23/2018 266 150 - 450 10*3/uL Final       Assessment & Plan     ASSESSMENT  1. Right leg recurrent DVT with in the background of factor II/prothrombin gene heterozygous mutation which was discovered in 05/2013.   - He was tested negative otherwise. His protein C and protein S study previously was done when he was on Coumadin therapy. Those need to be repeated in the future.     - We discussed with the patient today that we use the new NOAC oral anticoagulation medications such as Eliquis, Pradaxa, or Xarelto more widely than before. They are more convenient and effective, and I think the benefit is that he can be placed on low dose maintenance anticoagulation instead of full dose Coumadin. I discussed with the patient the pros and the cons of those new medications, and he is willing to try.     PLAN  Obtain right leg venous Doppler study within a week or two for reassessment of DVT condition.   Start low dose maintenance Eliquis 2.5 mg twice a day. I e-scribed to his pharmacy. If it is affordable, he will make the switch and  discontinue Coumadin anticoagulation completely.   The patient will follow up in 3 months for reevaluation to assess his tolerance and management. I will check CBC at that time.  Will consider testing for protein C and protein S profile in the future.  I also advised the patient the daughters to be tested for the prothrombin gene mutations.    Discussed with the patient about the lab results and the management.     ORAL TODD M.D., Ph.D.               Transcribed from ambient dictation for Oral Todd MD PhD by Kathleen Oswald.  03/19/24   11:08 EDT    Patient or patient representative verbalized consent to the visit recording.  I have personally performed the services described in this document as transcribed by the above individual, and it is both accurate and complete.    Oral Todd MD PhD

## 2024-03-20 ENCOUNTER — LAB (OUTPATIENT)
Dept: LAB | Facility: HOSPITAL | Age: 54
End: 2024-03-20
Payer: COMMERCIAL

## 2024-03-20 ENCOUNTER — ANTICOAGULATION VISIT (OUTPATIENT)
Dept: PHARMACY | Facility: HOSPITAL | Age: 54
End: 2024-03-20
Payer: COMMERCIAL

## 2024-03-20 ENCOUNTER — OFFICE VISIT (OUTPATIENT)
Dept: FAMILY MEDICINE CLINIC | Facility: CLINIC | Age: 54
End: 2024-03-20
Payer: COMMERCIAL

## 2024-03-20 VITALS
DIASTOLIC BLOOD PRESSURE: 84 MMHG | HEIGHT: 71 IN | HEART RATE: 76 BPM | SYSTOLIC BLOOD PRESSURE: 128 MMHG | OXYGEN SATURATION: 96 % | WEIGHT: 254.9 LBS | BODY MASS INDEX: 35.69 KG/M2

## 2024-03-20 DIAGNOSIS — Z00.00 ROUTINE HEALTH MAINTENANCE: ICD-10-CM

## 2024-03-20 DIAGNOSIS — D68.52 PROTHROMBIN GENE MUTATION: ICD-10-CM

## 2024-03-20 DIAGNOSIS — Z86.718 HISTORY OF DVT (DEEP VEIN THROMBOSIS): ICD-10-CM

## 2024-03-20 DIAGNOSIS — E66.9 CLASS 2 OBESITY WITH BODY MASS INDEX (BMI) OF 35.0 TO 35.9 IN ADULT, UNSPECIFIED OBESITY TYPE, UNSPECIFIED WHETHER SERIOUS COMORBIDITY PRESENT: ICD-10-CM

## 2024-03-20 DIAGNOSIS — E78.49 OTHER HYPERLIPIDEMIA: Primary | ICD-10-CM

## 2024-03-20 DIAGNOSIS — E78.49 OTHER HYPERLIPIDEMIA: ICD-10-CM

## 2024-03-20 DIAGNOSIS — D68.9 CLOTTING DISORDER: ICD-10-CM

## 2024-03-20 DIAGNOSIS — Z79.01 CHRONIC ANTICOAGULATION: ICD-10-CM

## 2024-03-20 DIAGNOSIS — Z12.5 SCREENING FOR PROSTATE CANCER: ICD-10-CM

## 2024-03-20 DIAGNOSIS — Z13.1 SCREENING FOR DIABETES MELLITUS: ICD-10-CM

## 2024-03-20 DIAGNOSIS — Z86.711 HISTORY OF PULMONARY EMBOLISM: ICD-10-CM

## 2024-03-20 DIAGNOSIS — N52.9 ERECTILE DYSFUNCTION, UNSPECIFIED ERECTILE DYSFUNCTION TYPE: ICD-10-CM

## 2024-03-20 DIAGNOSIS — Z13.220 SCREENING FOR LIPID DISORDERS: ICD-10-CM

## 2024-03-20 DIAGNOSIS — D68.9 CLOTTING DISORDER: Primary | ICD-10-CM

## 2024-03-20 DIAGNOSIS — Z00.00 ANNUAL PHYSICAL EXAM: Primary | ICD-10-CM

## 2024-03-20 LAB
ALBUMIN SERPL-MCNC: 4.2 G/DL (ref 3.5–5.2)
ALBUMIN/GLOB SERPL: 1.1 G/DL
ALP SERPL-CCNC: 130 U/L (ref 39–117)
ALT SERPL W P-5'-P-CCNC: 43 U/L (ref 1–41)
ANION GAP SERPL CALCULATED.3IONS-SCNC: 9.9 MMOL/L (ref 5–15)
AST SERPL-CCNC: 30 U/L (ref 1–40)
BILIRUB SERPL-MCNC: 0.4 MG/DL (ref 0–1.2)
BUN SERPL-MCNC: 14 MG/DL (ref 6–20)
BUN/CREAT SERPL: 14.1 (ref 7–25)
CALCIUM SPEC-SCNC: 9.6 MG/DL (ref 8.6–10.5)
CHLORIDE SERPL-SCNC: 102 MMOL/L (ref 98–107)
CHOLEST SERPL-MCNC: 270 MG/DL (ref 0–200)
CO2 SERPL-SCNC: 26.1 MMOL/L (ref 22–29)
CREAT SERPL-MCNC: 0.99 MG/DL (ref 0.76–1.27)
EGFRCR SERPLBLD CKD-EPI 2021: 90.5 ML/MIN/1.73
GLOBULIN UR ELPH-MCNC: 3.8 GM/DL
GLUCOSE SERPL-MCNC: 105 MG/DL (ref 65–99)
HBA1C MFR BLD: 5.5 % (ref 4.8–5.6)
HDLC SERPL-MCNC: 43 MG/DL (ref 40–60)
INR PPP: 1.57 (ref 0.9–1.1)
LDLC SERPL CALC-MCNC: 194 MG/DL (ref 0–100)
LDLC/HDLC SERPL: 4.47 {RATIO}
POTASSIUM SERPL-SCNC: 4.6 MMOL/L (ref 3.5–5.2)
PROT SERPL-MCNC: 8 G/DL (ref 6–8.5)
PROTHROMBIN TIME: 19 SECONDS (ref 11.7–14.2)
PSA SERPL-MCNC: 1.22 NG/ML (ref 0–4)
SODIUM SERPL-SCNC: 138 MMOL/L (ref 136–145)
TRIGL SERPL-MCNC: 175 MG/DL (ref 0–150)
TSH SERPL DL<=0.05 MIU/L-ACNC: 1.42 UIU/ML (ref 0.27–4.2)
VLDLC SERPL-MCNC: 33 MG/DL (ref 5–40)

## 2024-03-20 PROCEDURE — 80053 COMPREHEN METABOLIC PANEL: CPT | Performed by: INTERNAL MEDICINE

## 2024-03-20 PROCEDURE — 84443 ASSAY THYROID STIM HORMONE: CPT | Performed by: INTERNAL MEDICINE

## 2024-03-20 PROCEDURE — G0103 PSA SCREENING: HCPCS | Performed by: INTERNAL MEDICINE

## 2024-03-20 PROCEDURE — 83036 HEMOGLOBIN GLYCOSYLATED A1C: CPT | Performed by: INTERNAL MEDICINE

## 2024-03-20 PROCEDURE — 80061 LIPID PANEL: CPT | Performed by: INTERNAL MEDICINE

## 2024-03-20 PROCEDURE — 85610 PROTHROMBIN TIME: CPT | Performed by: INTERNAL MEDICINE

## 2024-03-20 RX ORDER — ROSUVASTATIN CALCIUM 10 MG/1
10 TABLET, COATED ORAL DAILY
Qty: 90 TABLET | Refills: 0 | Status: SHIPPED | OUTPATIENT
Start: 2024-03-20

## 2024-03-20 RX ORDER — TADALAFIL 10 MG/1
10 TABLET ORAL DAILY PRN
Qty: 30 TABLET | Refills: 7 | Status: SHIPPED | OUTPATIENT
Start: 2024-03-20

## 2024-03-20 NOTE — ASSESSMENT & PLAN NOTE
Diabetes screening: Today  Lipid screening: Today  Colon cancer screening: Had colonoscopy in 2020 for positive Cologuard  It was normal (at [facility]), repeat 2030  Prostate cancer screening: No FH prostate CA but pt has been getting screening PSA tests for a few years. Would like to continue  Discussed limiting or avoidance of alcohol, has cut back  Does not use tobacco  Discussed recommendation for seatbelt and sunscreen use   Discussed recommendation for yearly eye and dental exams  Vaccines: Declines COVID vaccine and second Shingrix vaccine  Diet and exercise habits discussed  FH: Sister had breast cancer and lymphoma and passed away of this in the last year  No prostate or colon cancer in the family  Father had CABG in his 70s

## 2024-03-20 NOTE — PROGRESS NOTES
This visit is for documentation purposes only: Patient is now taking apixaban. No longer following in the [facility]. It has been a pleasure being part of his care.

## 2024-03-20 NOTE — PROGRESS NOTES
Chief Complaint  Annual Exam    Subjective        HPI   Darshan presents to [facility] for a physical.    Diabetes screening: Today  Lipid screening: Today  Colon cancer screening: Had colonoscopy in 2020 for positive Cologuard  It was normal (at [place]), repeat 2030  Prostate cancer screening: No FH prostate CA but pt has been getting screening PSA tests for a few years. Would like to continue  Discussed limiting or avoidance of alcohol, has cut back  Does not use tobacco  Discussed recommendation for seatbelt and sunscreen use   Discussed recommendation for yearly eye and dental exams  Vaccines: Declines COVID vaccine and second Shingrix vaccine  Diet and exercise habits discussed  Drinking more water, less ETOH  Has lost 12 lbs recently  Exercising more but now his shoulder and knee are bothering him  FH: Sister had breast cancer and lymphoma and passed away of this in the last year  No prostate or colon cancer in the family  Father had CABG in his 70s    Separate from physical/wellness visit, we discussed the following:    Hx DVT/PE/PGM: Spent significant amount of time last week addressing his anticoagulation. Pt has been non-compliant with Warfarin and INR checks. Pt states he has preferred to stay on it as he is used to it and knows his levels will be therapeutic if he takes it regularly. Has not taken Lovenox yet due to issues getting it from pharmacy. Saw Dr. Burnett yesterday. Has some chronic RLE edema, unchanged from prior.     Needs Tadalafil refill. No side effects    Needs to see Dr. Bergeron for him, think he has a tear in his right shoulder and also has some knee pain. No weakness, numbness, tingling.  Pt last saw Dr. Bergeron in 2022 so can just give her office a call to schedule an appt.        Objective   Vital Signs:  Vitals:    03/20/24 0926   BP: 128/84   BP Location: Right arm   Patient Position: Sitting   Cuff Size: Large Adult   Pulse: 76   SpO2: 96%   Weight: 116 kg  "(254 lb 14.4 oz)   Height: 180.3 cm (70.98\")          Physical Exam  Constitutional:       General: He is not in acute distress.     Appearance: Normal appearance. He is not ill-appearing or toxic-appearing.   HENT:      Head: Normocephalic and atraumatic.      Right Ear: Tympanic membrane and ear canal normal.      Left Ear: Tympanic membrane and ear canal normal.      Mouth/Throat:      Mouth: Mucous membranes are moist.      Pharynx: Oropharynx is clear. No oropharyngeal exudate or posterior oropharyngeal erythema.   Eyes:      General: No scleral icterus.        Right eye: No discharge.         Left eye: No discharge.   Cardiovascular:      Rate and Rhythm: Normal rate and regular rhythm.      Heart sounds: Normal heart sounds. No murmur heard.     No gallop.   Pulmonary:      Effort: Pulmonary effort is normal. No respiratory distress.      Breath sounds: Normal breath sounds. No wheezing or rhonchi.   Abdominal:      General: Bowel sounds are normal. There is no distension.      Palpations: Abdomen is soft.      Tenderness: There is no abdominal tenderness. There is no guarding or rebound.   Musculoskeletal:         General: No swelling, tenderness or deformity. Normal range of motion.      Cervical back: Normal range of motion and neck supple. No rigidity.      Right lower leg: Edema present.      Left lower leg: Edema present.      Comments: Tr BLE edema R>L   Lymphadenopathy:      Cervical: No cervical adenopathy.   Skin:     General: Skin is warm and dry.      Coloration: Skin is not jaundiced or pale.      Findings: No lesion or rash.   Neurological:      General: No focal deficit present.      Mental Status: He is alert and oriented to person, place, and time.   Psychiatric:         Mood and Affect: Mood normal.         Behavior: Behavior normal.         Judgment: Judgment normal.          Result Review :     The following data was reviewed by: Susanne Richard MD on 03/20/2024:  POC Protime / INR " (03/18/2024 09:12)  CBC & Differential (03/19/2024 08:22)         Assessment and Plan    Diagnoses and all orders for this visit:    Annual physical exam (Primary)    History of DVT (deep vein thrombosis)    Chronic anticoagulation    Screening for diabetes mellitus  -     Hemoglobin A1c    Screening for prostate cancer  -     PSA Screen    Class 2 obesity with body mass index (BMI) of 35.0 to 35.9 in adult, unspecified obesity type, unspecified whether serious comorbidity present  -     Hemoglobin A1c  -     TSH Rfx On Abnormal To Free T4    Other hyperlipidemia  Assessment & Plan:  Lipid panel today  Pt has worked on increasing exercise, improving diet, cutting down on ETOH in the last month. Continue this. May not see full effects of lifestyle changes for another couple mos.  His father does have CAD, increasing pt's risk for CAD  F/u lipid panel    Orders:  -     Lipid Panel  -     TSH Rfx On Abnormal To Free T4    Prothrombin gene mutation  Assessment & Plan:  See prior notes about issues with compliance with warfarin and INR checks.  Recent lapse in rx (I was unaware of this issue in a timely fashion as task was not routed to me). Nonetheless, pt has not been getting INRs regularly and INRs have been subtherapeutic. I told pt it is not safe to skip INR checks, etc.  Patient now is following with the anticoagulation clinic.  I recommended he follow-up with Dr. Burnett again.  Dr. Burnett's full note is pending but pt states he recommended Apixaban. Patient is concerned about side effects with apixaban, he states he knows warfarin better.  I told him the potential side effects and risks are similar with both of these medications but that he would not need regular INR checks with the apixaban.  Patient states he just prefers warfarin since he has been on it so long and knows he is therapeutic if he actually takes it. I think Apixaban would be a great choice, if his insurance will cover.  INR today per AC  clinic    Erectile dysfunction, unspecified erectile dysfunction type  Assessment & Plan:  No side effects, continue tadalafil as needed    Orders:  -     tadalafil (CIALIS) 10 MG tablet; Take 1 tablet by mouth Daily As Needed for Erectile Dysfunction.  Dispense: 30 tablet; Refill: 7    History of pulmonary embolism    Routine health maintenance  Assessment & Plan:  Diabetes screening: Today  Lipid screening: Today  Colon cancer screening: Had colonoscopy in 2020 for positive Cologuard  It was normal (at [place]), repeat 2030  Prostate cancer screening: No FH prostate CA but pt has been getting screening PSA tests for a few years. Would like to continue  Discussed limiting or avoidance of alcohol, has cut back  Does not use tobacco  Discussed recommendation for seatbelt and sunscreen use   Discussed recommendation for yearly eye and dental exams  Vaccines: Declines COVID vaccine and second Shingrix vaccine  Diet and exercise habits discussed  FH: Sister had breast cancer and lymphoma and passed away of this in the last year  No prostate or colon cancer in the family  Father had CABG in his 70s      Follow Up   Return in about 6 months (around 9/20/2024) for Recheck, Next scheduled follow up.  Patient was given instructions and counseling regarding his condition or for health maintenance advice. Please see specific information pulled into the AVS if appropriate.

## 2024-03-20 NOTE — ASSESSMENT & PLAN NOTE
See prior notes about issues with compliance with warfarin and INR checks.  Recent lapse in rx (I was unaware of this issue in a timely fashion as task was not routed to me). Nonetheless, pt has not been getting INRs regularly and INRs have been subtherapeutic. I told pt it is not safe to skip INR checks, etc.  Patient now is following with the anticoagulation clinic.  I recommended he follow-up with Dr. Burnett again.  Dr. Burnett's full note is pending but pt states he recommended Apixaban. Patient is concerned about side effects with apixaban, he states he knows warfarin better.  I told him the potential side effects and risks are similar with both of these medications but that he would not need regular INR checks with the apixaban.  Patient states he just prefers warfarin since he has been on it so long and knows he is therapeutic if he actually takes it. I think Apixaban would be a great choice, if his insurance will cover.

## 2024-03-20 NOTE — ASSESSMENT & PLAN NOTE
Lipid panel today  Pt has worked on increasing exercise, improving diet, cutting down on ETOH in the last month. Continue this. May not see full effects of lifestyle changes for another couple mos.  His father does have CAD, increasing pt's risk for CAD  F/u lipid panel

## 2024-04-02 NOTE — PROGRESS NOTES
New Right  Knee      Patient: Darshan Frias        YOB: 1970    Medical Record Number: [id number]        Chief Complaints: Right knee right shoulder pain      History of Present Illness: This is a 54-year-old male who have seen in the past for left knee who presents complaining of right knee pain has been ongoing 2 to 3 months he states has been walking more try to lose weight get healthier and his knee has been bothering him is primarily anterior he has recently been put on Eliquis formally was on Coumadin for many years for history of blood clots is also complaining of right shoulder pain no history injury change in activity tells me this towards the end of our visit        Allergies: No Known Allergies    Medications:   Home Medications:  Current Outpatient Medications on File Prior to Visit   Medication Sig    apixaban (ELIQUIS) 2.5 MG tablet tablet Take 1 tablet by mouth 2 (Two) Times a Day.    rosuvastatin (Crestor) 10 MG tablet Take 1 tablet by mouth Daily.    acyclovir (ZOVIRAX) 400 MG tablet TAKE 1 TABLET BY MOUTH THREE TIMES DAILY FOR 5 DAYS (FOR COLD SORE OUTBREAK) (Patient not taking: Reported on 4/4/2024)    Enoxaparin Sodium (LOVENOX) 120 MG/0.8ML solution prefilled syringe syringe Inject 0.7 mL under the skin into the appropriate area as directed Every 12 (Twelve) Hours. (Patient not taking: Reported on 3/20/2024)    tadalafil (CIALIS) 10 MG tablet Take 1 tablet by mouth Daily As Needed for Erectile Dysfunction.    warfarin (COUMADIN) 5 MG tablet Take 2 tablets by mouth Daily for 30 days. Take 2 tablets (10 mg) by mouth daily or as directed     No current facility-administered medications on file prior to visit.     Current Medications:  Scheduled Meds:  Continuous Infusions:No current facility-administered medications for this visit.    PRN Meds:.    Past Medical History:   Diagnosis Date    Deep vein thrombosis     History of erectile dysfunction     Knee swelling     Tendinitis of  "knee         Past Surgical History:   Procedure Laterality Date    COLONOSCOPY  2020    Dr. Ernts        Social History     Occupational History     Employer: [employer]   Tobacco Use    Smoking status: Never    Smokeless tobacco: Never   Vaping Use    Vaping status: Never Used   Substance and Sexual Activity    Alcohol use: Yes     Alcohol/week: 6.0 standard drinks of alcohol     Types: 6 Cans of beer per week     Comment: occasionally    Drug use: Never    Sexual activity: Yes     Partners: Female      Social History     Social History Narrative    Not on file        Family History   Problem Relation Age of Onset    Breast cancer Sister     Leukemia Sister     Cancer Sister         Passed away 2023    Clotting disorder Sister              Review of Systems:     Review of Systems      Physical Exam: 54 y.o. male  General Appearance:    Alert, cooperative, in no acute distress                   Vitals:    04/04/24 1348   Temp: 98.4 °F (36.9 °C)   Weight: 118 kg (259 lb 11.2 oz)   Height: 180.3 cm (71\")   PainSc:   6      Patient is alert and read ×3 no acute distress appears her above-listed at height weight and age.  Affect is normal respiratory rate is normal unlabored. Heart rate regular rate rhythm, sclera, dentition and hearing are normal for the purpose of this exam.        Ortho Exam  Physical exam of the right knee reveals no effusion, no erythema.  The patient has no palpable tenderness along the medial joint line, no tenderness about the lateral joint line.  Patient does have crepitus with patellofemoral range of motion.  They also have subjective symptoms anteriorly during exam.  The patient has a negative bounce home, negative Daniel and a stable ligamentous exam.  Quad tone is reasonable and symmetric.  There are no overlying skin changes no lymphedema no lymphadenopathy.  There is good hip range of motion which is full symmetric and asymptomatic and a normal ankle exam.  Hamstrings " and IT band are tight bilaterally.   Physical exam of the right shoulder reveals no overlying skin changes no lymphedema no lymphadenopathy.  Patient has active flexion 180 with mild symptoms abduction is similar external rotation is to 50 and internal rotation to the upper lumbar spine with mild symptoms.  Patient has good rotator cuff strength 4+ over 5 with isometric strength testing with pain.  Patient has a positive impingement and a positive Rodriguez sign.  Patient has good cervical range of motion which is full and asymptomatic no radicular symptoms.  Patient has a normal elbow exam.  Good distal pulses are present  Patient has pain with overhead activity and a positive Neer sign and a positive empty can sign , a positive drop arm and a definitive painful arc   Large Joint Arthrocentesis: R knee  Date/Time: 4/4/2024 1:55 PM  Consent given by: patient  Site marked: site marked  Timeout: Immediately prior to procedure a time out was called to verify the correct patient, procedure, equipment, support staff and site/side marked as required   Supporting Documentation  Indications: pain, joint swelling and diagnostic evaluation   Procedure Details  Location: knee - R knee  Preparation: Patient was prepped and draped in the usual sterile fashion  Needle gauge: 21G.  Medications administered: 1 mL methylPREDNISolone acetate 80 MG/ML; 2 mL lidocaine PF 1% 1 %  Patient tolerance: patient tolerated the procedure well with no immediate complications                 Radiology:   AP, Lateral and merchant views of the right knee  were ordered/reviewed to evauateknee pain.  I have no comparative films readily available he has some patellofemoral arthritis otherwise no acute bony pathology is considered moderate  Imaging Results (Most Recent)       Procedure Component Value Units Date/Time    XR Knee 3 View Right [[id number]] Resulted: 04/04/24 1335     Updated: 04/04/24 1335    Impression:      Ordering physician's impression  is located in the Encounter Note dated 04/04/24. X-ray performed in the DR room.            Assessment/Plan:      Right knee pain I really think this is more patellofemoral and I suspect walking hills is irritated his knee he responded well to injection in the left knee several years ago he cannot take oral anti-inflammatories we will start him on an injection today and I will have him see physical therapy working on quad and core strengthening I will also have them work on his shoulder as I think this is more impingement I will see him back in 5 weeks if he is shoulder still symptomatic I would x-rayed and consider an injection if the knee is still symptomatic would consider other means of testing by the fact he is on Eliquis he understands his risk of the injections a little bit higher he knows to be more diligent with ice                            Answers submitted by the patient for this visit:  Primary Reason for Visit (Submitted on 4/2/2024)  What is the primary reason for your visit?: Other  Other (Submitted on 4/2/2024)  Please describe your symptoms.: Knee issues....my knee pops quite often and it becomes painful to walk.  I also wanted to have Ms Bergeron look at my right shoulder (torn muscle)  Have you had these symptoms before?: Yes  How long have you been having these symptoms?: Greater than 2 weeks

## 2024-04-04 ENCOUNTER — OFFICE VISIT (OUTPATIENT)
Dept: ORTHOPEDIC SURGERY | Facility: CLINIC | Age: 54
End: 2024-04-04
Payer: COMMERCIAL

## 2024-04-04 VITALS — WEIGHT: 259.7 LBS | HEIGHT: 71 IN | BODY MASS INDEX: 36.36 KG/M2 | TEMPERATURE: 98.4 F

## 2024-04-04 DIAGNOSIS — M75.41 IMPINGEMENT SYNDROME OF RIGHT SHOULDER: ICD-10-CM

## 2024-04-04 DIAGNOSIS — R52 PAIN: Primary | ICD-10-CM

## 2024-04-04 DIAGNOSIS — M17.10 PATELLOFEMORAL ARTHRITIS: ICD-10-CM

## 2024-04-04 RX ORDER — LIDOCAINE HYDROCHLORIDE 10 MG/ML
2 INJECTION, SOLUTION EPIDURAL; INFILTRATION; INTRACAUDAL; PERINEURAL
Status: COMPLETED | OUTPATIENT
Start: 2024-04-04 | End: 2024-04-04

## 2024-04-04 RX ORDER — METHYLPREDNISOLONE ACETATE 80 MG/ML
1 INJECTION, SUSPENSION INTRA-ARTICULAR; INTRALESIONAL; INTRAMUSCULAR; SOFT TISSUE
Status: COMPLETED | OUTPATIENT
Start: 2024-04-04 | End: 2024-04-04

## 2024-04-04 RX ADMIN — LIDOCAINE HYDROCHLORIDE 2 ML: 10 INJECTION, SOLUTION EPIDURAL; INFILTRATION; INTRACAUDAL; PERINEURAL at 13:55

## 2024-04-04 RX ADMIN — METHYLPREDNISOLONE ACETATE 1 ML: 80 INJECTION, SUSPENSION INTRA-ARTICULAR; INTRALESIONAL; INTRAMUSCULAR; SOFT TISSUE at 13:55

## 2024-04-24 ENCOUNTER — TREATMENT (OUTPATIENT)
Dept: PHYSICAL THERAPY | Facility: CLINIC | Age: 54
End: 2024-04-24
Payer: COMMERCIAL

## 2024-04-24 DIAGNOSIS — R68.89 IMPAIRED FUNCTION OF UPPER EXTREMITY: ICD-10-CM

## 2024-04-24 DIAGNOSIS — M75.41 CORACOID IMPINGEMENT OF RIGHT SHOULDER: Primary | ICD-10-CM

## 2024-04-24 PROCEDURE — 97033 APP MDLTY 1+IONTPHRSIS EA 15: CPT | Performed by: PHYSICAL THERAPIST

## 2024-04-24 PROCEDURE — 97140 MANUAL THERAPY 1/> REGIONS: CPT | Performed by: PHYSICAL THERAPIST

## 2024-04-24 PROCEDURE — 97162 PT EVAL MOD COMPLEX 30 MIN: CPT | Performed by: PHYSICAL THERAPIST

## 2024-04-24 PROCEDURE — 97035 APP MDLTY 1+ULTRASOUND EA 15: CPT | Performed by: PHYSICAL THERAPIST

## 2024-04-24 PROCEDURE — 97110 THERAPEUTIC EXERCISES: CPT | Performed by: PHYSICAL THERAPIST

## 2024-04-24 NOTE — PROGRESS NOTES
Physical Therapy Initial Evaluation and Plan of Care  [facility]  [address], [address]  [place] [address]    Patient: Darshan Frias   : 1970  Diagnosis/ICD-10 Code:  Coracoid impingement of right shoulder [M75.41]  Referring practitioner: Bessie Bergeron MD  Today's Date: 2024    Subjective Evaluation    History of Present Illness  Mechanism of injury: 15 years ago was doing a  bench press and felt a tearing sensation in the right shoulder  Had some diagnostics and thinks that it showed a partial of a muscle - not sure which  Flare up a couple of months ago when lifting a small child - had hard time lifting the arm again - eventually resolved   Saw Dr Bergeron for right knee injection and brought up shoulder dysfunciton to her  Referred to PT  Lifts free weights and machines, plays golf      Patient Occupation: Admin work at [employer]  Current pain ratin  At best pain ratin  At worst pain ratin  Location: anterior shoulder, no popping or shifting, does have numbness in both hands on occasiona - no new  Quality: knife-like, sharp and discomfort  Relieving factors: rest  Aggravating factors: outstretched reach and overhead activity (behind the back, lie on it, push off of elbow)  Progression: no change    Hand dominance: right    Diagnostic Tests  No diagnostic tests performed    Treatments  Current treatment: physical therapy  Patient Goals  Patient goal: better usage of hte arm         Objective          Postural Observations  Seated posture: fair  Standing posture: fair      Observations     Additional Shoulder Observation Details  Protracted shoulders  NO atrophy noted    Palpation     Right   Hypertonic in the upper trapezius. Tenderness of the supraspinatus.     Tenderness     Right Shoulder  Tenderness in the biceps tendon (proximal), bicipital groove and supraspinatus tendon.     Neurological Testing     Sensation      Shoulder     Right Shoulder   Intact: Light touch    Active Range of Motion     Right Shoulder   Flexion: 145 degrees   Abduction: 147 degrees   External rotation 45°: 80 degrees   Internal rotation 45°: 80 degrees     Strength/Myotome Testing     Right Shoulder     Planes of Motion   Flexion: 4+   Extension: 5   Abduction: 4+   External rotation at 0°: 4+   Internal rotation at 0°: 5     Tests   Cervical     Right   Positive active compression ([place]).     Right Shoulder   Positive crossover, empty can, Hawkin's and painful arc.   Negative drop arm, full can and Speed's.           Assessment & Plan       Assessment  Impairments: abnormal muscle firing, abnormal muscle tone, abnormal or restricted ROM, activity intolerance, impaired physical strength, lacks appropriate home exercise program and pain with function   Functional limitations: carrying objects, lifting, pulling, pushing, uncomfortable because of pain, reaching behind back, reaching overhead and unable to perform repetitive tasks   Assessment details: 54 y.o. male with right shoulder impingement presents with: 1. Intermittent right shoulder pain, 2. Decreased shoulder AROM, 3. Tenderness to palpation right supraspinatus tendon, 4. Positive Impingement signs, 5. Slight decreased strength right shoulder mm, 6. Poor postural awareness, 7. Decreased tolerance for work outs and playing golf due to pain  Prognosis: good    Goals  Plan Goals: Short Term Goals: 3 weeks  Patient will be able to tolerate initial exercises  Patient will have pain <5/10  Patient will be able to sleep without pain interruption   Patient will be able to push up from a chair without pain    Long Term Goals: 6 weeks  Patient will be independent in performing home exercise program.  Patient will have functional pain free shoulder AROM  Patient will be able to reach [place] upper kitchen cabinets to place/retrieve items without pain  Patient will be able to play a round of golf without  pain      Plan  Therapy options: will be seen for skilled therapy services  Planned modality interventions: iontophoresis and ultrasound  Planned therapy interventions: manual therapy, postural training, strengthening, stretching, therapeutic activities, home exercise program and joint mobilization  Frequency: 1x week  Duration in visits: 6  Duration in weeks: 6  Treatment plan discussed with: patient  Plan details: Access Code: [email or web address]  URL: [email or web address]  Date: 04/24/2024  Prepared by: Tamara Ross    Exercises  - Sleeper Stretch  - 2 x daily - 7 x weekly - 1 sets - 3 reps - 20 sec hold  - Standing Shoulder Posterior Capsule Stretch  - 2 x daily - 7 x weekly - 1 sets - 3 reps - 20 sec hold  - Doorway Pec Stretch at 90 Degrees Abduction  - 2 x daily - 7 x weekly - 1 sets - 3 reps - 20 sec hold  - Doorway Pec Stretch at 120 Degrees Abduction  - 2 x daily - 7 x weekly - 1 sets - 3 reps - 20 sec hold  - Standing Shoulder Row with Anchored Resistance  - 1 x daily - 7 x weekly - 2 sets - 15 reps  - Shoulder extension with resistance - Neutral  - 1 x daily - 7 x weekly - 2 sets - 15 reps  - Shoulder External Rotation with Anchored Resistance  - 1 x daily - 7 x weekly - 2 sets - 15 reps  - Shoulder Internal Rotation with Resistance  - 1 x daily - 7 x weekly - 2 sets - 15 reps        Manual Therapy:    10     mins  41424;  Therapeutic Exercise:    15     mins  24239;     Neuromuscular Wero:    0    mins  10300;    Therapeutic Activity:     0     mins  12671;     Ultrasound                  __8_  mins  24194  Iontophoresis                 8    mins  06235    Electrical Stimulation     0    mins  31386 ([id number])  Traction                         _0  mins  74117     Evaluation Time:     20  mins  Timed Treatment:   41   mins   Total Treatment:     65   mins    PT: Tamara Ross PT     License Number: [place] [id number]  Electronically signed by Tamara Ross PT, 04/24/24, 7:31 AM EDT    Certification Period:  4/24/2024 thru 7/22/2024  I certify that the therapy services are furnished while this patient is under my care.  The services outlined above are required by this patient, and will be reviewed every 90 days.         Physician Signature:__________________________________________________    PHYSICIAN: Bessie Bergeron MD      DATE:     Please sign and return via fax to [email or web address] . Thank you, [facility].    PT SIGNATURE: Tamara Ross, JAY   [place] LICENSE:  [id number]

## 2024-04-30 ENCOUNTER — TREATMENT (OUTPATIENT)
Dept: PHYSICAL THERAPY | Facility: CLINIC | Age: 54
End: 2024-04-30
Payer: COMMERCIAL

## 2024-04-30 DIAGNOSIS — R68.89 IMPAIRED FUNCTION OF UPPER EXTREMITY: ICD-10-CM

## 2024-04-30 DIAGNOSIS — M75.41 CORACOID IMPINGEMENT OF RIGHT SHOULDER: Primary | ICD-10-CM

## 2024-04-30 PROCEDURE — 97033 APP MDLTY 1+IONTPHRSIS EA 15: CPT | Performed by: PHYSICAL THERAPIST

## 2024-04-30 PROCEDURE — 97035 APP MDLTY 1+ULTRASOUND EA 15: CPT | Performed by: PHYSICAL THERAPIST

## 2024-04-30 PROCEDURE — 97140 MANUAL THERAPY 1/> REGIONS: CPT | Performed by: PHYSICAL THERAPIST

## 2024-04-30 PROCEDURE — 97110 THERAPEUTIC EXERCISES: CPT | Performed by: PHYSICAL THERAPIST

## 2024-04-30 NOTE — PROGRESS NOTES
Physical Therapy Daily Treatment Note  [facility]  [address], [address]  [place] [address]    Patient: Darshan Frias   : 1970  Referring practitioner: Dr Bergeron  Today's Date: 2024  Patient seen for 2 sessions    Visit Diagnoses:    ICD-10-CM ICD-9-CM   1. Coracoid impingement of right shoulder  M75.41 726.2   2. Impaired function of upper extremity  R68.89 V49.5       Subjective   Darshan Frias reports: that his shoulder felt much better after the last session.  Thinks maybe that he over did with his exercises and was sore on Thursday.      Objective   Tenderness supraspinatus insertion    See Exercise, Manual, and Modality Logs for complete treatment.     Patient Education: Required cueing for proper exercise technique    Assessment/Plan  Tanner did very well the first couple of days after his last appt but then had increased pain after doing his exercise incorrectly.  Did not progress his exercises as we had to review for proper form on existing ones    Progress strengthening /stabilization /functional activity           Timed:  Manual Therapy:    10     mins  55712;  Therapeutic Exercise:    15     mins  53300;     Neuromuscular Wero:    0    mins  33416;    Therapeutic Activity:     0     mins  74942;     Ultrasound:      8     mins  13350;    Iontophoresis              __8_   mins  Dry Needling               _____   mins        Untimed:  Electrical Stimulation:     0    mins  51624 ( );  Mechanical Traction:             mins  57194;   Paraffin                       _____  mins     Timed Treatment:   41   mins   Total Treatment:     45   mins    Tamara Ross PT  [place] License # [id number]  Physical Therapist    Electronically signed by Tamara Ross PT, 24, 7:19 AM EDT

## 2024-05-08 ENCOUNTER — TREATMENT (OUTPATIENT)
Dept: PHYSICAL THERAPY | Facility: CLINIC | Age: 54
End: 2024-05-08
Payer: COMMERCIAL

## 2024-05-08 DIAGNOSIS — R68.89 IMPAIRED FUNCTION OF UPPER EXTREMITY: ICD-10-CM

## 2024-05-08 DIAGNOSIS — M75.41 CORACOID IMPINGEMENT OF RIGHT SHOULDER: Primary | ICD-10-CM

## 2024-05-08 PROCEDURE — 97110 THERAPEUTIC EXERCISES: CPT | Performed by: PHYSICAL THERAPIST

## 2024-05-08 PROCEDURE — 97140 MANUAL THERAPY 1/> REGIONS: CPT | Performed by: PHYSICAL THERAPIST

## 2024-05-08 PROCEDURE — 97033 APP MDLTY 1+IONTPHRSIS EA 15: CPT | Performed by: PHYSICAL THERAPIST

## 2024-05-21 ENCOUNTER — TREATMENT (OUTPATIENT)
Dept: PHYSICAL THERAPY | Facility: CLINIC | Age: 54
End: 2024-05-21
Payer: COMMERCIAL

## 2024-05-21 DIAGNOSIS — M75.41 CORACOID IMPINGEMENT OF RIGHT SHOULDER: Primary | ICD-10-CM

## 2024-05-21 DIAGNOSIS — R68.89 IMPAIRED FUNCTION OF UPPER EXTREMITY: ICD-10-CM

## 2024-05-21 PROCEDURE — 97033 APP MDLTY 1+IONTPHRSIS EA 15: CPT | Performed by: PHYSICAL THERAPIST

## 2024-05-21 PROCEDURE — 97110 THERAPEUTIC EXERCISES: CPT | Performed by: PHYSICAL THERAPIST

## 2024-05-21 PROCEDURE — 97140 MANUAL THERAPY 1/> REGIONS: CPT | Performed by: PHYSICAL THERAPIST

## 2024-05-21 PROCEDURE — 97035 APP MDLTY 1+ULTRASOUND EA 15: CPT | Performed by: PHYSICAL THERAPIST

## 2024-05-21 NOTE — LETTER
Physical Therapy Daily Treatment Note - Reassessment  [facility] Physical Therapy [facility]  [address], [address]  [place] [address]    Patient: Darshan Frias   : 1970  Referring practitioner: No ref. provider found  Today's Date: 2024  Patient seen for 4 sessions    Visit Diagnoses:    ICD-10-CM ICD-9-CM   1. Coracoid impingement of right shoulder  M75.41 726.2   2. Impaired function of upper extremity  R68.89 V49.5       Subjective   Darshan Frias reports: that his shoulder is feeling quite a bit better than it was initially but still has pain when he pushes off of it while in bed.  Sh does have some discomfort when reaching behind his back to put his belt through the belt loops.  He states that the pain is much less frequent and now varies from 0-4/10 compared to 0-6/10 on his initial evaluation.       Objective   Shoulder AROM - flexion 155*,  Abduction  153*    Slightly positive Empty Can and Rodriguez Alvin tests     See Exercise, Manual, and Modality Logs for complete treatment.     Patient Education: Sleeper stretch, now that it is more comfortable    Assessment/Plan  Tanner's motion has increased and his pain has decreased in both frequency and intensity.  He still does have some pain with pushing off of the arm when getting out of bed.    He still does show some signs of impingement but much less noted than prior to therapy.  I feel that he would benefit from a further short course of therapy.    Progress strengthening /stabilization /functional activity           Timed:  Manual Therapy:    10     mins  52673;  Therapeutic Exercise:    15     mins  14712;     Neuromuscular Wero:    0    mins  69740;    Therapeutic Activity:     0     mins  63106;     Ultrasound:      8     mins  91097;    Iontophoresis              __8_   mins  Dry Needling               _____   mins        Untimed:  Electrical Stimulation:     0    mins  49883 ( );  Mechanical Traction:              mins  48872;   Paraffin                       _____  mins     Timed Treatment:   41   mins   Total Treatment:     45   mins    Tamara Ross, PT  [place] License # [id number]  Physical Therapist    Electronically signed by Tamara Ross PT, 05/21/24, 6:59 AM EDT

## 2024-05-21 NOTE — PROGRESS NOTES
Physical Therapy Daily Treatment Note - Reassessment  [facility] Physical Therapy [facility]  [address], [address]  [place] [address]    Patient: Darshan Frias   : 1970  Referring practitioner: No ref. provider found  Today's Date: 2024  Patient seen for 4 sessions    Visit Diagnoses:    ICD-10-CM ICD-9-CM   1. Coracoid impingement of right shoulder  M75.41 726.2   2. Impaired function of upper extremity  R68.89 V49.5       Subjective   Darshan Frias reports: that his shoulder is feeling quite a bit better than it was initially but still has pain when he pushes off of it while in bed.  Sh does have some discomfort when reaching behind his back to put his belt through the belt loops.  He states that the pain is much less frequent and now varies from 0-4/10 compared to 0-6/10 on his initial evaluation.       Objective   Shoulder AROM - flexion 155*,  Abduction  153*    Slightly positive Empty Can and Rodriguez Alvin tests     See Exercise, Manual, and Modality Logs for complete treatment.     Patient Education: Sleeper stretch, now that it is more comfortable    Assessment/Plan  Tanner's motion has increased and his pain has decreased in both frequency and intensity.  He still does have some pain with pushing off of the arm when getting out of bed.    He still does show some signs of impingement but much less noted than prior to therapy.  I feel that he would benefit from a further short course of therapy.    Progress strengthening /stabilization /functional activity           Timed:  Manual Therapy:    10     mins  94713;  Therapeutic Exercise:    15     mins  20803;     Neuromuscular Wero:    0    mins  92760;    Therapeutic Activity:     0     mins  24382;     Ultrasound:      8     mins  57623;    Iontophoresis              __8_   mins  Dry Needling               _____   mins        Untimed:  Electrical Stimulation:     0    mins  46542 ( );  Mechanical Traction:              mins  72524;   Paraffin                       _____  mins     Timed Treatment:   41   mins   Total Treatment:     45   mins    Tamara Ross, PT  [place] License # [id number]  Physical Therapist    Electronically signed by Tamara Ross PT, 05/21/24, 6:59 AM EDT

## 2024-05-22 NOTE — PROGRESS NOTES
Patient: Darshan Frias  YOB: 1970  Date of Service: 5/22/2024    Chief Complaints: Right knee right shoulder pain    Subjective:    History of Present Illness: Pt is seen in the office today with complaints of right knee right shoulder pain I last saw him for his knee we injected his knee he states it is doing much better he still has some popping but overall doing well he and I talked about the shoulder a little bit last time he injured it 10 to 15 years ago doing a  bench press he felt a tearing sensation at that time he had intermittent problems with that since then he is right-hand-dominant last 6 to 8 weeks has been lifting his wife's grandchild and he thinks suspect irritated        Allergies: No Known Allergies    Medications:   Home Medications:  Current Outpatient Medications on File Prior to Visit   Medication Sig    acyclovir (ZOVIRAX) 400 MG tablet TAKE 1 TABLET BY MOUTH THREE TIMES DAILY FOR 5 DAYS (FOR COLD SORE OUTBREAK) (Patient not taking: Reported on 4/4/2024)    apixaban (ELIQUIS) 2.5 MG tablet tablet Take 1 tablet by mouth 2 (Two) Times a Day.    Enoxaparin Sodium (LOVENOX) 120 MG/0.8ML solution prefilled syringe syringe Inject 0.7 mL under the skin into the appropriate area as directed Every 12 (Twelve) Hours. (Patient not taking: Reported on 3/20/2024)    rosuvastatin (Crestor) 10 MG tablet Take 1 tablet by mouth Daily.    tadalafil (CIALIS) 10 MG tablet Take 1 tablet by mouth Daily As Needed for Erectile Dysfunction.    warfarin (COUMADIN) 5 MG tablet Take 2 tablets by mouth Daily for 30 days. Take 2 tablets (10 mg) by mouth daily or as directed     No current facility-administered medications on file prior to visit.     Current Medications:  Scheduled Meds:  Continuous Infusions:No current facility-administered medications for this visit.    PRN Meds:.    I have reviewed the patient's medical history in detail and updated the computerized patient record.  Review and  summarization of old records include:    Past Medical History:   Diagnosis Date    Deep vein thrombosis     History of erectile dysfunction     Knee swelling     Right shoulder pain     15 years    Tendinitis of knee         Past Surgical History:   Procedure Laterality Date    COLONOSCOPY  2020    Dr. Ernst        Social History     Occupational History     Employer: [employer]   Tobacco Use    Smoking status: Never    Smokeless tobacco: Never   Vaping Use    Vaping status: Never Used   Substance and Sexual Activity    Alcohol use: Yes     Alcohol/week: 6.0 standard drinks of alcohol     Types: 6 Cans of beer per week     Comment: occasionally    Drug use: Never    Sexual activity: Yes     Partners: Female      Social History     Social History Narrative    Not on file        Family History   Problem Relation Age of Onset    Breast cancer Sister     Leukemia Sister     Cancer Sister         Passed away 2023    Clotting disorder Sister        ROS: 14 point review of systems was performed and was negative except for documented findings in HPI and today's encounter.     Allergies: No Known Allergies  Constitutional:  Denies fever, shaking or chills   Eyes:  Denies change in visual acuity   HENT:  Denies nasal congestion or sore throat   Respiratory:  Denies cough or shortness of breath   Cardiovascular:  Denies chest pain or severe LE edema   GI:  Denies abdominal pain, nausea, vomiting, bloody stools or diarrhea   Musculoskeletal:  Numbness, tingling, or loss of motor function only as noted above in history of present illness.  : Denies painful urination or hematuria  Integument:  Denies rash, lesion or ulceration   Neurologic:  Denies headache or focal weakness  Endocrine:  Denies lymphadenopathy  Psych:  Denies confusion or change in mental status   Hem:  Denies active bleeding      Physical Exam: 54 y.o. male  Wt Readings from Last 3 Encounters:   04/04/24 118 kg (259 lb 11.2 oz)   03/20/24 116  kg (254 lb 14.4 oz)   03/19/24 117 kg (258 lb 12.8 oz)       There is no height or weight on file to calculate BMI.    There were no vitals filed for this visit.  Vital signs reviewed.   General Appearance:    Alert, cooperative, in no acute distress                    Ortho exam    Physical exam of the right shoulder reveals no overlying skin changes no lymphedema no lymphadenopathy.  Patient has active flexion 180 with mild symptoms abduction is similar external rotation is to 50 and internal rotation to the upper lumbar spine with mild symptoms.  Patient has good rotator cuff strength 4+ over 5 with isometric strength testing with pain.  Patient has a positive impingement and a positive Rodriguez sign.  Patient has good cervical range of motion which is full and asymptomatic no radicular symptoms.  Patient has a normal elbow exam.  Good distal pulses are present  Patient has pain with overhead activity and a positive Neer sign and a positive empty can sign , a positive drop arm and a definitive painful arc       X-rays AP scapular Y and x-ray lateral right shoulder were taken to evaluate his no comparative films he has some acromioclavicular arthritis otherwise no acute bony pathology      Assessment: Right shoulder pain I think this is impingement it could be labral pathology based on his mechanism of injury was actually pretty good I think it is reasonable to inject this he is seeing physical therapy we will continue should his symptoms plateau or worsen we will get an MRI if I get an MRI I would do this with  He is on Eliquis he knows to be diligent with ice  Plan:   Follow up as indicated.  Ice, elevate, and rest as needed.    Bessie Bergeron M.D.    Large Joint Arthrocentesis: R subacromial bursa  Date/Time: 5/23/2024 4:52 PM  Consent given by: patient  Site marked: site marked  Timeout: Immediately prior to procedure a time out was called to verify the correct patient, procedure, equipment, support staff and  site/side marked as required   Supporting Documentation  Indications: pain   Procedure Details  Location: shoulder - R subacromial bursa  Preparation: Patient was prepped and draped in the usual sterile fashion  Needle gauge: 21G.  Approach: posterior  Medications administered: 80 mg methylPREDNISolone acetate 80 MG/ML; 2 mL lidocaine PF 1% 1 %  Patient tolerance: patient tolerated the procedure well with no immediate complications

## 2024-05-23 ENCOUNTER — OFFICE VISIT (OUTPATIENT)
Dept: ORTHOPEDIC SURGERY | Facility: CLINIC | Age: 54
End: 2024-05-23
Payer: COMMERCIAL

## 2024-05-23 VITALS — BODY MASS INDEX: 35.11 KG/M2 | HEIGHT: 71 IN | TEMPERATURE: 98.3 F | WEIGHT: 250.8 LBS

## 2024-05-23 DIAGNOSIS — M75.41 IMPINGEMENT SYNDROME OF RIGHT SHOULDER: Primary | ICD-10-CM

## 2024-05-23 RX ORDER — METHYLPREDNISOLONE ACETATE 80 MG/ML
80 INJECTION, SUSPENSION INTRA-ARTICULAR; INTRALESIONAL; INTRAMUSCULAR; SOFT TISSUE
Status: COMPLETED | OUTPATIENT
Start: 2024-05-23 | End: 2024-05-23

## 2024-05-23 RX ORDER — LIDOCAINE HYDROCHLORIDE 10 MG/ML
2 INJECTION, SOLUTION EPIDURAL; INFILTRATION; INTRACAUDAL; PERINEURAL
Status: COMPLETED | OUTPATIENT
Start: 2024-05-23 | End: 2024-05-23

## 2024-05-23 RX ADMIN — METHYLPREDNISOLONE ACETATE 80 MG: 80 INJECTION, SUSPENSION INTRA-ARTICULAR; INTRALESIONAL; INTRAMUSCULAR; SOFT TISSUE at 16:52

## 2024-05-23 RX ADMIN — LIDOCAINE HYDROCHLORIDE 2 ML: 10 INJECTION, SOLUTION EPIDURAL; INFILTRATION; INTRACAUDAL; PERINEURAL at 16:52

## 2024-05-28 ENCOUNTER — TREATMENT (OUTPATIENT)
Dept: PHYSICAL THERAPY | Facility: CLINIC | Age: 54
End: 2024-05-28
Payer: COMMERCIAL

## 2024-05-28 DIAGNOSIS — M75.41 CORACOID IMPINGEMENT OF RIGHT SHOULDER: Primary | ICD-10-CM

## 2024-05-28 DIAGNOSIS — R68.89 IMPAIRED FUNCTION OF UPPER EXTREMITY: ICD-10-CM

## 2024-05-28 PROCEDURE — 97035 APP MDLTY 1+ULTRASOUND EA 15: CPT | Performed by: PHYSICAL THERAPIST

## 2024-05-28 PROCEDURE — 97140 MANUAL THERAPY 1/> REGIONS: CPT | Performed by: PHYSICAL THERAPIST

## 2024-05-28 PROCEDURE — 97110 THERAPEUTIC EXERCISES: CPT | Performed by: PHYSICAL THERAPIST

## 2024-05-28 NOTE — PROGRESS NOTES
Physical Therapy Daily Treatment Note  [facility] Physical Therapy [facility]  [address], [address]  [place] [address]    Patient: Darshan Frias   : 1970  Referring practitioner: Bessie Bergeron MD  Today's Date: 2024  Patient seen for 5 sessions    Visit Diagnoses:    ICD-10-CM ICD-9-CM   1. Coracoid impingement of right shoulder  M75.41 726.2   2. Impaired function of upper extremity  R68.89 V49.5       Subjective   Darshan Frias reports: that he saw Dr Bergeron last week and received a cortisone shot.  She saw some Arthritis in the joint. He reports some relief with the injection.  He states that he continues to improve but still does feel the pain with his arm behind his back.  He states that the pain is in the anterior shoulder when present.      Objective   Functional AROM in right shoulder but does have slight pain at end range flexion and abduction as well as IR    Slight tenderness in supraspinatus tendon    See Exercise, Manual, and Modality Logs for complete treatment.     Patient Education: issued written copies of new exercises    Assessment/Plan  Tanner continues to improve.  Received steroid injection and having less impingement.  Did well with shoulder depression exercises    Progress strengthening /stabilization /functional activity           Timed:  Manual Therapy:    10     mins  33694;  Therapeutic Exercise:    20     mins  87966;     Neuromuscular Wero:    0    mins  69094;    Therapeutic Activity:     0     mins  01245;     Ultrasound:      8     mins  89296;    Iontophoresis              __0_   mins  Dry Needling               _____   mins        Untimed:  Electrical Stimulation:     0    mins  23657 ( );  Mechanical Traction:             mins  98452;   Paraffin                       _____  mins     Timed Treatment:   38   mins   Total Treatment:     45   mins    Tamara Ross, PT  [place] License # [id number]  Physical Therapist    Electronically signed by  Tamara Ross, PT, 05/28/24, 7:00 AM EDT

## 2024-06-11 ENCOUNTER — LAB (OUTPATIENT)
Dept: OTHER | Facility: HOSPITAL | Age: 54
End: 2024-06-11
Payer: COMMERCIAL

## 2024-06-11 ENCOUNTER — OFFICE VISIT (OUTPATIENT)
Dept: ONCOLOGY | Facility: CLINIC | Age: 54
End: 2024-06-11
Payer: COMMERCIAL

## 2024-06-11 VITALS
HEIGHT: 71 IN | WEIGHT: 259.7 LBS | DIASTOLIC BLOOD PRESSURE: 89 MMHG | BODY MASS INDEX: 36.36 KG/M2 | HEART RATE: 70 BPM | TEMPERATURE: 98 F | RESPIRATION RATE: 16 BRPM | SYSTOLIC BLOOD PRESSURE: 137 MMHG | OXYGEN SATURATION: 97 %

## 2024-06-11 DIAGNOSIS — Z86.711 HISTORY OF PULMONARY EMBOLISM: ICD-10-CM

## 2024-06-11 DIAGNOSIS — D68.52 PROTHROMBIN GENE MUTATION: ICD-10-CM

## 2024-06-11 DIAGNOSIS — Z86.718 HISTORY OF DVT (DEEP VEIN THROMBOSIS): Primary | ICD-10-CM

## 2024-06-11 DIAGNOSIS — D68.9 CLOTTING DISORDER: ICD-10-CM

## 2024-06-11 DIAGNOSIS — Z79.01 CHRONIC ANTICOAGULATION: ICD-10-CM

## 2024-06-11 LAB
BASOPHILS # BLD AUTO: 0.1 10*3/MM3 (ref 0–0.2)
BASOPHILS NFR BLD AUTO: 0.8 % (ref 0–1.5)
DEPRECATED RDW RBC AUTO: 42.5 FL (ref 37–54)
EOSINOPHIL # BLD AUTO: 0.27 10*3/MM3 (ref 0–0.4)
EOSINOPHIL NFR BLD AUTO: 2 % (ref 0.3–6.2)
ERYTHROCYTE [DISTWIDTH] IN BLOOD BY AUTOMATED COUNT: 13.1 % (ref 12.3–15.4)
HCT VFR BLD AUTO: 44.6 % (ref 37.5–51)
HGB BLD-MCNC: 15.4 G/DL (ref 13–17.7)
IMM GRANULOCYTES # BLD AUTO: 0.08 10*3/MM3 (ref 0–0.05)
IMM GRANULOCYTES NFR BLD AUTO: 0.6 % (ref 0–0.5)
LYMPHOCYTES # BLD AUTO: 3.07 10*3/MM3 (ref 0.7–3.1)
LYMPHOCYTES NFR BLD AUTO: 23.1 % (ref 19.6–45.3)
MCH RBC QN AUTO: 30.6 PG (ref 26.6–33)
MCHC RBC AUTO-ENTMCNC: 34.5 G/DL (ref 31.5–35.7)
MCV RBC AUTO: 88.7 FL (ref 79–97)
MONOCYTES # BLD AUTO: 0.78 10*3/MM3 (ref 0.1–0.9)
MONOCYTES NFR BLD AUTO: 5.9 % (ref 5–12)
NEUTROPHILS NFR BLD AUTO: 67.6 % (ref 42.7–76)
NEUTROPHILS NFR BLD AUTO: 9 10*3/MM3 (ref 1.7–7)
NRBC BLD AUTO-RTO: 0 /100 WBC (ref 0–0.2)
PLATELET # BLD AUTO: 242 10*3/MM3 (ref 140–450)
PMV BLD AUTO: 9.6 FL (ref 6–12)
RBC # BLD AUTO: 5.03 10*6/MM3 (ref 4.14–5.8)
WBC NRBC COR # BLD AUTO: 13.3 10*3/MM3 (ref 3.4–10.8)

## 2024-06-11 PROCEDURE — 85025 COMPLETE CBC W/AUTO DIFF WBC: CPT | Performed by: INTERNAL MEDICINE

## 2024-06-11 PROCEDURE — 36415 COLL VENOUS BLD VENIPUNCTURE: CPT

## 2024-06-11 PROCEDURE — 99214 OFFICE O/P EST MOD 30 MIN: CPT | Performed by: NURSE PRACTITIONER

## 2024-06-11 NOTE — PROGRESS NOTES
.     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 The patient is on lifelong anticoagulation with Coumadin.                              REQUESTING PHYSICIAN: No ref. provider found      RECORDS OBTAINED:  Records of the patient's history including those obtained from the referring provider were reviewed and summarized in detail.    HISTORY OF PRESENT ILLNESS:  The patient is a 54 y.o. year old male with history of recurrent DVT who returns today for follow-up visit continuing on Eliquis 2.5 mg twice daily.  He does report some ongoing swelling in his right lower extremity.  He also notes some occasional leg cramps.  He denies any issues with shortness of breath or chest pain.  He admits he has missed a few doses at times of his medication, but does try to stay on it regularly.    Past Medical History:   Diagnosis Date    Deep vein thrombosis     History of erectile dysfunction     Knee swelling     Right shoulder pain     15 years    Tendinitis of knee      Past Surgical History:   Procedure Laterality Date    COLONOSCOPY  2020    Dr. Ernst       HEMATOLOGY HISTORY: Mr. Frias was a 43-year-old  male who presented on 5/7/2020 for initial evaluation because of recurrent right leg deep venous thrombosis and was diagnosed with some sort of hypercoagulable status for which I do not know the details even after I reviewed his previ  ous medical records from Dr. Luna's office.          This patient had recurrent right leg edema in late April 2013 and he had venous duplex study on 4/22/13 which reported complete occlusion of the right superficial femoral vein and also popliteal vein.  Since   that time he has been taking Coumadin religiously and most recent outside lab 2/29/13 showed INR 1.6.  The patient reports no bleeding episodes. No Bruising.  He otherwise is at baseline condition with good  performance status ECOG 0.          After the patient left, I received more medical records from Dr. Luna's office.  The venous duplex study dated 1/31/08 reported a venous thrombosis in the right popliteal vein and peroneal vein.  The thrombus was not occlusive.  Other veins were clear of thrombosis.    I do not see lab study results for hypercoagulable status.  The clinic noted dated 3/31/08 categorized this, patient has primary hypercoagulable status but there was no mention of the abnormalities of the laboratory results.  The notes on 3/10/08 did not   mention neither.  Clinic notes 10/26/12 did not mention the abnormalities, only described that this patient needed long-term Coumadin anticoagulation.          According to the patient after the initial diagnosis in January 2008, he was taking not compliant and he did not followup as required for PT/INR monitoring at Dr. Luna'  s.  Eventually he was fired in the end of 2012 and he did not have a primary care physician and it sounds like he was not taking the Coumadin properly.    Since late April 2013, he had sign  ificant swelling of the left leg including the thigh area.  He had venous duplex study reporting occlusive right superficial femoral vein thrombosis and right popliteal vein thrombosis.  Since that time he has been taking Coumadin religiously according to     him.  His current dose is 10 mg and 7.5 mg alternating every other day.  Today his INR is 2.6.  I instructed him to take Coumadin 10 mg on Tuesday, Thursdays and Saturdays and the rest of the days taking 7.5 mg and he will have repeat labs next week at th  e time of our evaluation. In the meantime, I will request complete hypercoagulable status workup for evaluation.           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  The patient also   tested negative for anticardiolipin antibodies and antiphosphatidylserine  antibodies, negative for lupus anticoagulant and anti-beta 2 GPI antibodies.  Antithrombin-III activity was 104%.  The patient had depressed protein C activity of 16%, depressed pro  tein S activity of 49%, and protein S free antigen 50%.  The depressed protein C and protein S activity levels likely are related to Coumadin therapy.              MEDICATIONS    Current Outpatient Medications:     apixaban (ELIQUIS) 2.5 MG tablet tablet, Take 1 tablet by mouth 2 (Two) Times a Day., Disp: 180 tablet, Rfl: 1    rosuvastatin (Crestor) 10 MG tablet, Take 1 tablet by mouth Daily., Disp: 90 tablet, Rfl: 0    acyclovir (ZOVIRAX) 400 MG tablet, TAKE 1 TABLET BY MOUTH THREE TIMES DAILY FOR 5 DAYS (FOR COLD SORE OUTBREAK) (Patient not taking: Reported on 4/4/2024), Disp: , Rfl:     ALLERGIES:   No Known Allergies    SOCIAL HISTORY:       Social History     Socioeconomic History    Marital status:      Spouse name: Ale   Tobacco Use    Smoking status: Never    Smokeless tobacco: Never   Vaping Use    Vaping status: Never Used   Substance and Sexual Activity    Alcohol use: Yes     Alcohol/week: 6.0 standard drinks of alcohol     Types: 6 Cans of beer per week     Comment: occasionally    Drug use: Never    Sexual activity: Yes     Partners: Female         FAMILY HISTORY:  Family History   Problem Relation Age of Onset    Breast cancer Sister     Leukemia Sister     Cancer Sister         Passed away 2023    Clotting disorder Sister        REVIEW OF SYSTEMS:  Review of Systems   Constitutional:  Negative for unexpected weight change.   HENT:  Negative for hearing loss, mouth sores and sore throat.    Respiratory:  Negative for cough and shortness of breath.    Cardiovascular:  Negative for chest pain and leg swelling.   Gastrointestinal:  Negative for abdominal pain, anal bleeding and blood in stool.   Genitourinary:  Negative for hematuria.   Musculoskeletal:  Negative for joint swelling.   Skin:  Negative for color  "change.   Allergic/Immunologic: Negative for immunocompromised state.   Hematological:  Negative for adenopathy.   Psychiatric/Behavioral:  Negative for confusion.               Vitals:    06/11/24 1452   BP: 137/89   Pulse: 70   Resp: 16   Temp: 98 °F (36.7 °C)   TempSrc: Oral   SpO2: 97%   Weight: 118 kg (259 lb 11.2 oz)   Height: 180 cm (70.87\")   PainSc: 0-No pain         6/11/2024     2:53 PM   Current Status   ECOG score 0     Physical Exam  Vitals and nursing note reviewed.   Constitutional:       Appearance: Normal appearance. He is well-developed.   HENT:      Head: Normocephalic and atraumatic.      Nose: Nose normal.   Eyes:      Pupils: Pupils are equal, round, and reactive to light.   Cardiovascular:      Rate and Rhythm: Normal rate and regular rhythm.      Heart sounds: Normal heart sounds.   Pulmonary:      Effort: Pulmonary effort is normal. No respiratory distress.      Breath sounds: Normal breath sounds. No wheezing, rhonchi or rales.   Abdominal:      General: Bowel sounds are normal. There is no distension.      Palpations: Abdomen is soft.      Tenderness: There is no abdominal tenderness.   Musculoskeletal:         General: Normal range of motion.      Cervical back: Normal range of motion and neck supple.      Comments: Trace bilateral LE edema right slightly worse than left   Skin:     General: Skin is warm and dry.   Neurological:      Mental Status: He is alert and oriented to person, place, and time.   Psychiatric:         Behavior: Behavior normal.           RECENT LABS:        WBC   Date Value Ref Range Status   06/11/2024 13.30 (H) 3.40 - 10.80 10*3/mm3 Final   03/19/2024 9.18 3.40 - 10.80 10*3/mm3 Final   03/20/2023 10.64 3.40 - 10.80 10*3/mm3 Final   03/29/2022 7.4 3.4 - 10.8 x10E3/uL Final   02/22/2021 8.71 4.5 - 11.0 10*3/uL Final   01/14/2020 9.51 4.5 - 11.0 10*3/uL Final   07/23/2018 10.3 4.5 - 11.0 10*3/uL Final     Hemoglobin   Date Value Ref Range Status   06/11/2024 15.4 " 13.0 - 17.7 g/dL Final   03/19/2024 16.0 13.0 - 17.7 g/dL Final   03/20/2023 16.5 13.0 - 17.7 g/dL Final   03/29/2022 15.6 13.0 - 17.7 g/dL Final   02/22/2021 15.3 13.5 - 17.5 g/dL Final   01/14/2020 15.9 13.5 - 17.5 g/dL Final   07/23/2018 15.6 13.5 - 17.5 g/dL Final     Platelets   Date Value Ref Range Status   06/11/2024 242 140 - 450 10*3/mm3 Final   03/19/2024 248 140 - 450 10*3/mm3 Final   03/20/2023 259 140 - 450 10*3/mm3 Final   03/29/2022 253 150 - 450 x10E3/uL Final   02/22/2021 280 140 - 440 10*3/uL Final   01/14/2020 289 140 - 440 10*3/uL Final   07/23/2018 266 150 - 450 10*3/uL Final       Assessment & Plan     ASSESSMENT  1. Right leg recurrent DVT with in the background of factor II/prothrombin gene heterozygous mutation which was discovered in 05/2013.   - He was tested negative otherwise. His protein C and protein S study previously was done when he was on Coumadin therapy. Those need to be repeated in the future.   - We discussed with the patient today that we use the new NOAC oral anticoagulation medications such as Eliquis, Pradaxa, or Xarelto more widely than before. They are more convenient and effective, and I think the benefit is that he can be placed on low dose maintenance anticoagulation instead of full dose Coumadin. I discussed with the patient the pros and the cons of those new medications, and he is willing to try.   -Patient started on low-dose maintenance Eliquis 2.5 mg twice daily.  -Seen in follow-up 6/11/2024 continuing Eliquis 2.5 mg twice daily, tolerating well.  Reviewed CBC today and his hemoglobin is stable.  His white blood cell count is slightly elevated but he did have a steroid injection recently.    PLAN  Continue Eliquis 2.5 mg twice daily.  Patient encouraged to wear compression socks to help with swelling.  Follow-up in about 4 to 5 months with Dr. Burnett with repeat CBC.  Will consider testing for protein C and protein S profile in the future.  Call/ return sooner  should the patient develop any new concerns or problems.      Patient is on a high risk medication requiring close monitoring for toxicity.

## 2024-06-21 DIAGNOSIS — E78.49 OTHER HYPERLIPIDEMIA: ICD-10-CM

## 2024-06-21 RX ORDER — ROSUVASTATIN CALCIUM 10 MG/1
10 TABLET, COATED ORAL DAILY
Qty: 90 TABLET | Refills: 0 | Status: SHIPPED | OUTPATIENT
Start: 2024-06-21

## 2024-06-26 DIAGNOSIS — B00.1 COLD SORE: Primary | ICD-10-CM

## 2024-06-26 RX ORDER — VALACYCLOVIR HYDROCHLORIDE 1 G/1
2000 TABLET, FILM COATED ORAL 2 TIMES DAILY
Qty: 4 TABLET | Refills: 5 | Status: SHIPPED | OUTPATIENT
Start: 2024-06-26 | End: 2024-06-27

## 2024-06-26 RX ORDER — ACYCLOVIR 400 MG/1
TABLET ORAL
Status: CANCELLED | OUTPATIENT
Start: 2024-06-26

## 2024-06-26 NOTE — TELEPHONE ENCOUNTER
Rx Refill Note  Requested Prescriptions     Pending Prescriptions Disp Refills    acyclovir (ZOVIRAX) 400 MG tablet       Sig: Take no more than 5 doses a day.      Last office visit with prescribing clinician: 3/20/2024   Last telemedicine visit with prescribing clinician: Visit date not found   Next office visit with prescribing clinician: 7/3/2024                         Would you like a call back once the refill request has been completed: [] Yes [] No    If the office needs to give you a call back, can they leave a voicemail: [] Yes [] No    Alcides Fowler MA  06/26/24, 13:39 EDT

## 2024-07-22 ENCOUNTER — OFFICE VISIT (OUTPATIENT)
Dept: FAMILY MEDICINE CLINIC | Facility: CLINIC | Age: 54
End: 2024-07-22
Payer: COMMERCIAL

## 2024-07-22 VITALS
HEIGHT: 71 IN | BODY MASS INDEX: 37.27 KG/M2 | SYSTOLIC BLOOD PRESSURE: 122 MMHG | OXYGEN SATURATION: 96 % | HEART RATE: 76 BPM | WEIGHT: 266.2 LBS | TEMPERATURE: 97.9 F | DIASTOLIC BLOOD PRESSURE: 86 MMHG

## 2024-07-22 DIAGNOSIS — E78.2 MIXED HYPERLIPIDEMIA: ICD-10-CM

## 2024-07-22 DIAGNOSIS — R10.11 RUQ ABDOMINAL PAIN: Primary | ICD-10-CM

## 2024-07-22 DIAGNOSIS — Z79.01 CHRONIC ANTICOAGULATION: ICD-10-CM

## 2024-07-22 PROCEDURE — 99214 OFFICE O/P EST MOD 30 MIN: CPT

## 2024-07-22 NOTE — PROGRESS NOTES
Chief Complaint  Establish Care, Hyperlipidemia, and Abdominal Pain (RUQ x2 yrs.)    Subjective          Hyperlipidemia  Pertinent negatives include no chest pain, myalgias or shortness of breath.   Abdominal Pain  Pertinent negatives include no constipation, diarrhea, dysuria, fever, frequency, myalgias, nausea or vomiting.       Darshan Frias 54 y.o. male presents today for a transfer patient appointment. He is here to establish care and is a new patient to me. I reviewed the PFSH recorded today by my MA/LPN staff.       Mr. Frias is transferring care from Dr. Susanne Richard.     Mr. Frias reports RUQ abdominal pain. Symptoms have been present for 2 years .  The condition is aggravated by standing and walking . He is experiencing RUQ abdominal pain.  Alleviating factors are sitting with great results and controlling symptoms . Patient denies fever, chills, diarrhea, constipation, change in stools, GI symptoms waking them up, abdominal pain, dyschezia, melena, bright red blood in stool, fatty food intolerance, nausea, hematuria, dysphagia, reflux, vomiting, dysphagia, and pain referring to the back. His past medical history is notable for no prior issures.  Patient denies recent travel.  He drinks 6 beers per week on the weekends.     Mr. Frias is anticoagulated on Apixaban (Eliquis) 2.5 mg twice daily due to history of DVT. He also has prothrombin gene mutation that is managed by Dr. Burnett, Hematology. He denies calf pain, unilateral swelling/pain/erythema/tenderness, chest pain, shortness of breath, tachycardia, and cough.    He has hyperlipidemia and takes Rosuvastatin 10 mg daily. Last lipid panel revealed LDL of 194. He admits he does not follow a low-cholesterol diet and does miss some doses of medication. Will recheck a lipid panel and liver function.      TSH, PSA, and A1c were all normal in March of this year.         Review of Systems   Constitutional:  Negative for appetite change, chills, fatigue  "and fever.   HENT:  Negative for congestion, sinus pressure and trouble swallowing.    Eyes:  Negative for visual disturbance.   Respiratory:  Negative for cough, chest tightness, shortness of breath and wheezing.    Cardiovascular:  Negative for chest pain, palpitations and leg swelling.   Gastrointestinal:  Positive for abdominal pain. Negative for abdominal distention, anal bleeding, blood in stool, constipation, diarrhea, nausea, rectal pain and vomiting.   Genitourinary:  Negative for dysuria, frequency and urgency.   Musculoskeletal:  Negative for gait problem, myalgias and neck pain.   Skin:  Negative for rash.   Neurological:  Negative for dizziness, syncope, weakness and light-headedness.   Psychiatric/Behavioral:  Negative for dysphoric mood. The patient is not nervous/anxious.         Objective   Vital Signs:   /86   Pulse 76   Temp 97.9 °F (36.6 °C) (Oral)   Ht 180.3 cm (71\")   Wt 121 kg (266 lb 3.2 oz)   SpO2 96%   BMI 37.13 kg/m²      Class 2 Severe Obesity (BMI >=35 and <=39.9). Obesity-related health conditions include the following: dyslipidemias. Obesity is worsening. BMI is is above average; BMI management plan is completed. We discussed portion control and increasing exercise.       Physical Exam  Vitals and nursing note reviewed.   Constitutional:       General: He is not in acute distress.     Appearance: He is well-developed. He is obese. He is not ill-appearing or diaphoretic.   HENT:      Head: Normocephalic and atraumatic.   Eyes:      General: No scleral icterus.     Conjunctiva/sclera: Conjunctivae normal.      Pupils: Pupils are equal, round, and reactive to light.   Neck:      Vascular: No carotid bruit.   Cardiovascular:      Rate and Rhythm: Normal rate and regular rhythm.      Heart sounds: Normal heart sounds.   Pulmonary:      Effort: Pulmonary effort is normal. No respiratory distress.      Breath sounds: No wheezing or rales.   Abdominal:      General: Bowel sounds " are normal. There is no distension or abdominal bruit.      Palpations: Abdomen is soft. Abdomen is not rigid. There is no hepatomegaly, splenomegaly or mass.      Tenderness: There is abdominal tenderness in the right upper quadrant. There is no guarding or rebound. Positive signs include Lozano's sign.      Comments: Moderate RUQ tenderness with palpation. Positive Lozano's sign. Abdomen palpates soft. No guarding or rebound tenderness. Bowel sounds are active in all four quadrants.    Musculoskeletal:         General: No deformity. Normal range of motion.      Cervical back: Normal range of motion and neck supple.   Skin:     General: Skin is warm and dry.      Findings: No rash.   Neurological:      Mental Status: He is alert and oriented to person, place, and time.   Psychiatric:         Mood and Affect: Mood normal.          The following data was reviewed by: NARCISO Jay on 07/22/2024:  Lipid Panel (03/20/2024 10:33)   TSH Rfx On Abnormal To Free T4 (03/20/2024 10:33)  PSA Screen (03/20/2024 10:33)  Hemoglobin A1c (03/20/2024 10:33)               Assessment and Plan      Assessment & Plan  RUQ abdominal pain  Onset two years ago  US gallbladder and labs ordered  Avoid fried fatty foods, alcohol intake  Report to the ED if symptoms worsen-warning signs were reviewed with the patient   Mixed hyperlipidemia   Lipid abnormalities are newly identified    Plan:  Continue same medication/s without change.      Discussed medication dosage, use, side effects, and goals of treatment in detail.    Counseled patient on lifestyle modifications to help control hyperlipidemia.   Cholesterol lowering dietary information shared with patient.  Advised patient to exercise for 150 minutes weekly. (30 minute brisk walk, 5 days a week for example)  Weight Loss encouraged    Patient Treatment Goals:   LDL goal is less than 70    Follow up in 6 months.  Chronic anticoagulation  Continue Eliquis daily  Follow up with  Hematology    Orders Placed This Encounter   Procedures    US Gallbladder    Comprehensive metabolic panel    Lipase    Lipid panel    CBC w AUTO Differential                 Follow Up     Return if symptoms worsen or fail to improve.    Patient was given instructions and counseling regarding his condition or for health maintenance advice. Please see specific information pulled into the AVS if appropriate.

## 2024-07-27 LAB
ALBUMIN SERPL-MCNC: 4.2 G/DL (ref 3.5–5.2)
ALBUMIN/GLOB SERPL: 1.4 G/DL
ALP SERPL-CCNC: 121 U/L (ref 39–117)
ALT SERPL-CCNC: 49 U/L (ref 1–41)
AST SERPL-CCNC: 26 U/L (ref 1–40)
BASOPHILS # BLD AUTO: 0.11 10*3/MM3 (ref 0–0.2)
BASOPHILS NFR BLD AUTO: 1.1 % (ref 0–1.5)
BILIRUB SERPL-MCNC: 0.7 MG/DL (ref 0–1.2)
BUN SERPL-MCNC: 15 MG/DL (ref 6–20)
BUN/CREAT SERPL: 17.2 (ref 7–25)
CALCIUM SERPL-MCNC: 9.2 MG/DL (ref 8.6–10.5)
CHLORIDE SERPL-SCNC: 101 MMOL/L (ref 98–107)
CHOLEST SERPL-MCNC: 248 MG/DL (ref 0–200)
CO2 SERPL-SCNC: 22.7 MMOL/L (ref 22–29)
CREAT SERPL-MCNC: 0.87 MG/DL (ref 0.76–1.27)
EGFRCR SERPLBLD CKD-EPI 2021: 102.5 ML/MIN/1.73
EOSINOPHIL # BLD AUTO: 0.8 10*3/MM3 (ref 0–0.4)
EOSINOPHIL NFR BLD AUTO: 7.8 % (ref 0.3–6.2)
ERYTHROCYTE [DISTWIDTH] IN BLOOD BY AUTOMATED COUNT: 13.1 % (ref 12.3–15.4)
GLOBULIN SER CALC-MCNC: 2.9 GM/DL
GLUCOSE SERPL-MCNC: 82 MG/DL (ref 65–99)
HCT VFR BLD AUTO: 44.3 % (ref 37.5–51)
HDLC SERPL-MCNC: 54 MG/DL (ref 40–60)
HGB BLD-MCNC: 14.7 G/DL (ref 13–17.7)
IMM GRANULOCYTES # BLD AUTO: 0.04 10*3/MM3 (ref 0–0.05)
IMM GRANULOCYTES NFR BLD AUTO: 0.4 % (ref 0–0.5)
LDLC SERPL CALC-MCNC: 174 MG/DL (ref 0–100)
LIPASE SERPL-CCNC: 26 U/L (ref 13–60)
LYMPHOCYTES # BLD AUTO: 2.77 10*3/MM3 (ref 0.7–3.1)
LYMPHOCYTES NFR BLD AUTO: 27 % (ref 19.6–45.3)
MCH RBC QN AUTO: 30.3 PG (ref 26.6–33)
MCHC RBC AUTO-ENTMCNC: 33.2 G/DL (ref 31.5–35.7)
MCV RBC AUTO: 91.3 FL (ref 79–97)
MONOCYTES # BLD AUTO: 0.63 10*3/MM3 (ref 0.1–0.9)
MONOCYTES NFR BLD AUTO: 6.1 % (ref 5–12)
NEUTROPHILS # BLD AUTO: 5.9 10*3/MM3 (ref 1.7–7)
NEUTROPHILS NFR BLD AUTO: 57.6 % (ref 42.7–76)
NRBC BLD AUTO-RTO: 0 /100 WBC (ref 0–0.2)
PLATELET # BLD AUTO: 264 10*3/MM3 (ref 140–450)
POTASSIUM SERPL-SCNC: 4 MMOL/L (ref 3.5–5.2)
PROT SERPL-MCNC: 7.1 G/DL (ref 6–8.5)
RBC # BLD AUTO: 4.85 10*6/MM3 (ref 4.14–5.8)
SODIUM SERPL-SCNC: 136 MMOL/L (ref 136–145)
TRIGL SERPL-MCNC: 113 MG/DL (ref 0–150)
VLDLC SERPL CALC-MCNC: 20 MG/DL (ref 5–40)
WBC # BLD AUTO: 10.25 10*3/MM3 (ref 3.4–10.8)

## 2024-08-06 ENCOUNTER — HOSPITAL ENCOUNTER (OUTPATIENT)
Dept: ULTRASOUND IMAGING | Facility: HOSPITAL | Age: 54
Discharge: HOME | End: 2024-08-06
Payer: COMMERCIAL

## 2024-08-06 PROCEDURE — 76705 ECHO EXAM OF ABDOMEN: CPT

## 2024-09-11 ENCOUNTER — OFFICE VISIT (OUTPATIENT)
Dept: FAMILY MEDICINE CLINIC | Facility: CLINIC | Age: 54
End: 2024-09-11
Payer: COMMERCIAL

## 2024-09-11 VITALS
OXYGEN SATURATION: 96 % | DIASTOLIC BLOOD PRESSURE: 62 MMHG | TEMPERATURE: 98.4 F | WEIGHT: 262.6 LBS | HEIGHT: 71 IN | SYSTOLIC BLOOD PRESSURE: 100 MMHG | BODY MASS INDEX: 36.76 KG/M2 | HEART RATE: 73 BPM

## 2024-09-11 DIAGNOSIS — R05.3 PERSISTENT COUGH: Primary | ICD-10-CM

## 2024-09-11 PROCEDURE — 99213 OFFICE O/P EST LOW 20 MIN: CPT

## 2024-09-11 RX ORDER — CETIRIZINE HYDROCHLORIDE 10 MG/1
10 TABLET ORAL DAILY
Qty: 30 TABLET | Refills: 2 | Status: SHIPPED | OUTPATIENT
Start: 2024-09-11

## 2024-09-11 NOTE — PROGRESS NOTES
"Chief Complaint  Cough (Persistent x's 1-2 months)    Subjective          Cough  Associated symptoms include headaches and postnasal drip. Pertinent negatives include no chest pain, chills, ear pain, fever, myalgias, rash, rhinorrhea, sore throat, shortness of breath or wheezing.       Darshan Frias 54 y.o. male presents for evaluation of cough. Symptoms include dry cough. Onset of symptoms was 1-2  months ago, gradually worsening since that time. He reports postnasal drip that is worse in the morning. Patient denies shortness of breath, wheezing, hemoptysis, pleuritic chest pain, fever, dyspnea on exertion, ear drainage, eye discharge, vomiting, chills, sweats, sinus pain, epistaxis, high fever, calf pain, palpitations, and reflux. Evaluation to date: none. Treatment to date: none. He has not increased fluids.  No clinical signs of PE.        Review of Systems   Constitutional:  Negative for appetite change, chills, fatigue and fever.   HENT:  Positive for postnasal drip. Negative for congestion, ear pain, rhinorrhea, sinus pressure, sore throat and trouble swallowing.    Eyes:  Negative for visual disturbance.   Respiratory:  Positive for cough. Negative for chest tightness, shortness of breath and wheezing.    Cardiovascular:  Negative for chest pain, palpitations and leg swelling.   Gastrointestinal:  Negative for abdominal pain, constipation, diarrhea, nausea and vomiting.   Genitourinary:  Negative for dysuria, frequency and urgency.   Musculoskeletal:  Negative for gait problem, myalgias and neck pain.   Skin:  Negative for rash.   Neurological:  Negative for dizziness, syncope, weakness and light-headedness.   Psychiatric/Behavioral:  Negative for dysphoric mood. The patient is not nervous/anxious.         Objective   Vital Signs:   /62 (BP Location: Left arm, Patient Position: Sitting, Cuff Size: Large Adult)   Pulse 73   Temp 98.4 °F (36.9 °C) (Oral)   Ht 180.3 cm (71\")   Wt 119 kg (262 lb 9.6 " oz)   SpO2 96%   BMI 36.63 kg/m²        Physical Exam  Vitals and nursing note reviewed.   Constitutional:       General: He is not in acute distress.     Appearance: He is well-developed. He is not toxic-appearing or diaphoretic.   HENT:      Head: Normocephalic and atraumatic. Hair is normal.      Right Ear: External ear normal. No drainage.      Left Ear: External ear normal. No drainage.      Nose: No mucosal edema.      Right Turbinates: Not enlarged or swollen.      Left Turbinates: Not enlarged or swollen.      Mouth/Throat:      Lips: Pink.      Mouth: No oral lesions.      Pharynx: Uvula midline. No pharyngeal swelling, oropharyngeal exudate, posterior oropharyngeal erythema or uvula swelling.      Tonsils: No tonsillar exudate or tonsillar abscesses.   Eyes:      General: No scleral icterus.        Right eye: No discharge.         Left eye: No discharge.      Conjunctiva/sclera: Conjunctivae normal.      Pupils: Pupils are equal, round, and reactive to light.   Cardiovascular:      Rate and Rhythm: Normal rate and regular rhythm.      Heart sounds: Normal heart sounds.   Pulmonary:      Effort: No accessory muscle usage or respiratory distress.      Breath sounds: Normal breath sounds. No stridor. No decreased breath sounds, wheezing, rhonchi or rales.      Comments: Lungs are clear to auscultation in all lobes.  Chest:      Chest wall: No tenderness.   Musculoskeletal:      Cervical back: Normal range of motion and neck supple.   Lymphadenopathy:      Cervical: No cervical adenopathy.   Skin:     General: Skin is warm and dry.      Findings: No rash.   Neurological:      Mental Status: He is alert and oriented to person, place, and time.      Motor: No abnormal muscle tone.   Psychiatric:         Mood and Affect: Mood normal.                         Assessment and Plan      Assessment & Plan  Persistent cough  Most likely allergy related  Start Zyrtec once daily  Increase water intake  Use a coolmist  humidifier as needed  Chest x-ray to rule out other possible causes  Return if no improvement    Orders Placed This Encounter   Procedures    XR Chest PA & Lateral     New Medications Ordered This Visit   Medications    cetirizine (zyrTEC) 10 MG tablet     Sig: Take 1 tablet by mouth Daily.     Dispense:  30 tablet     Refill:  2              Follow Up     Return if symptoms worsen or fail to improve.    Patient was given instructions and counseling regarding his condition or for health maintenance advice. Please see specific information pulled into the AVS if appropriate.

## 2024-09-13 ENCOUNTER — HOSPITAL ENCOUNTER (OUTPATIENT)
Dept: GENERAL RADIOLOGY | Facility: HOSPITAL | Age: 54
Discharge: HOME | End: 2024-09-13
Payer: COMMERCIAL

## 2024-09-13 PROCEDURE — 71046 X-RAY EXAM CHEST 2 VIEWS: CPT

## 2024-09-16 DIAGNOSIS — J40 BRONCHITIS: Primary | ICD-10-CM

## 2024-09-16 RX ORDER — AZITHROMYCIN 250 MG/1
TABLET, FILM COATED ORAL
Qty: 6 TABLET | Refills: 0 | Status: SHIPPED | OUTPATIENT
Start: 2024-09-16 | End: 2024-09-21

## 2024-10-21 ENCOUNTER — TELEPHONE (OUTPATIENT)
Dept: FAMILY MEDICINE CLINIC | Facility: CLINIC | Age: 54
End: 2024-10-21
Payer: COMMERCIAL

## 2024-10-21 RX ORDER — TADALAFIL 10 MG/1
10 TABLET ORAL DAILY PRN
Qty: 30 TABLET | Refills: 2 | Status: SHIPPED | OUTPATIENT
Start: 2024-10-21

## 2024-10-28 DIAGNOSIS — Z86.718 HISTORY OF DVT (DEEP VEIN THROMBOSIS): Primary | ICD-10-CM

## 2024-10-28 DIAGNOSIS — D68.52 PROTHROMBIN GENE MUTATION: ICD-10-CM

## 2024-12-03 NOTE — PROGRESS NOTES
Patient: Darshan Frias  YOB: 1970  Date of Service: 12/3/2024    Chief Complaints: Right knee pain    Subjective:    History of Present Illness: Pt is seen in the office today with complaints of right knee pain I last saw him in May he had degenerative changes we injected the knee he did get good relief.        Allergies: No Known Allergies    Medications:   Home Medications:  Current Outpatient Medications on File Prior to Visit   Medication Sig    apixaban (ELIQUIS) 2.5 MG tablet tablet Take 1 tablet by mouth 2 (Two) Times a Day.    cetirizine (zyrTEC) 10 MG tablet Take 1 tablet by mouth Daily.    rosuvastatin (CRESTOR) 10 MG tablet Take 1 tablet by mouth once daily    tadalafil (Cialis) 10 MG tablet Take 1 tablet by mouth Daily As Needed for Erectile Dysfunction.     No current facility-administered medications on file prior to visit.     Current Medications:  Scheduled Meds:  Continuous Infusions:No current facility-administered medications for this visit.    PRN Meds:.    I have reviewed the patient's medical history in detail and updated the computerized patient record.  Review and summarization of old records include:    Past Medical History:   Diagnosis Date    Deep vein thrombosis     History of erectile dysfunction     Knee swelling     Right shoulder pain     15 years    Tendinitis of knee         Past Surgical History:   Procedure Laterality Date    COLONOSCOPY  2020    Dr. Ernst        Social History     Occupational History     Employer: [employer]   Tobacco Use    Smoking status: Never     Passive exposure: Never    Smokeless tobacco: Never   Vaping Use    Vaping status: Never Used   Substance and Sexual Activity    Alcohol use: Yes     Alcohol/week: 6.0 standard drinks of alcohol     Types: 6 Cans of beer per week     Comment: occasionally    Drug use: Never    Sexual activity: Yes     Partners: Female      Social History     Social History Narrative    Not on file         Family History   Problem Relation Age of Onset    Breast cancer Sister     Leukemia Sister     Cancer Sister         Passed away 2023    Clotting disorder Sister        ROS: 14 point review of systems was performed and was negative except for documented findings in HPI and today's encounter.     Allergies: No Known Allergies  Constitutional:  Denies fever, shaking or chills   Eyes:  Denies change in visual acuity   HENT:  Denies nasal congestion or sore throat   Respiratory:  Denies cough or shortness of breath   Cardiovascular:  Denies chest pain or severe LE edema   GI:  Denies abdominal pain, nausea, vomiting, bloody stools or diarrhea   Musculoskeletal:  Numbness, tingling, or loss of motor function only as noted above in history of present illness.  : Denies painful urination or hematuria  Integument:  Denies rash, lesion or ulceration   Neurologic:  Denies headache or focal weakness  Endocrine:  Denies lymphadenopathy  Psych:  Denies confusion or change in mental status   Hem:  Denies active bleeding      Physical Exam: 54 y.o. male  Wt Readings from Last 3 Encounters:   09/11/24 119 kg (262 lb 9.6 oz)   07/22/24 121 kg (266 lb 3.2 oz)   06/11/24 118 kg (259 lb 11.2 oz)       There is no height or weight on file to calculate BMI.    There were no vitals filed for this visit.  Vital signs reviewed.   General Appearance:    Alert, cooperative, in no acute distress                    Ortho exam      physical exam the right  knee she has mild effusion no overlying skin changes no lymphedema no lymphadenopathy.  She is sitting in a genu valgum position.  She has -5° of extension flexion is to 125 she has palpable tenderness laterally more than medially and crepitus with range of motion.  Her calf is soft and nontender            Assessment: Right knee pain he has degenerative changes lateral patellofemoral he understands importance of quad and core strengthening and maintaining an ideal body weight his  current BMI is 36 he would like to do an injection which is fine is not ready for anything surgical.  If it is just an injection that he wants in the future he understands he will see Scott    Plan:   Follow up as indicated.  Ice, elevate, and rest as needed.  Discussed conservative measures of pain control including ice, bracing.  Also talked about the importance of strengthening and maintaining ideal body weight    Bessie Bergeron M.D.          Large Joint Arthrocentesis: R knee  Date/Time: 12/5/2024 8:49 AM  Consent given by: patient  Site marked: site marked  Timeout: Immediately prior to procedure a time out was called to verify the correct patient, procedure, equipment, support staff and site/side marked as required   Supporting Documentation  Indications: pain   Procedure Details  Location: knee - R knee  Preparation: Patient was prepped and draped in the usual sterile fashion  Needle gauge: 21G.  Medications administered: 1 mL methylPREDNISolone acetate 80 MG/ML; 2 mL lidocaine PF 1% 1 %  Patient tolerance: patient tolerated the procedure well with no immediate complications

## 2024-12-04 ENCOUNTER — OFFICE VISIT (OUTPATIENT)
Dept: ONCOLOGY | Facility: CLINIC | Age: 54
End: 2024-12-04
Payer: COMMERCIAL

## 2024-12-04 ENCOUNTER — LAB (OUTPATIENT)
Dept: LAB | Facility: HOSPITAL | Age: 54
End: 2024-12-04
Payer: COMMERCIAL

## 2024-12-04 VITALS
HEART RATE: 72 BPM | DIASTOLIC BLOOD PRESSURE: 88 MMHG | OXYGEN SATURATION: 96 % | SYSTOLIC BLOOD PRESSURE: 130 MMHG | BODY MASS INDEX: 37.42 KG/M2 | RESPIRATION RATE: 16 BRPM | TEMPERATURE: 98.3 F | HEIGHT: 71 IN | WEIGHT: 267.3 LBS

## 2024-12-04 DIAGNOSIS — D68.52 PROTHROMBIN GENE MUTATION: ICD-10-CM

## 2024-12-04 DIAGNOSIS — Z86.718 HISTORY OF DVT (DEEP VEIN THROMBOSIS): Primary | ICD-10-CM

## 2024-12-04 DIAGNOSIS — Z86.718 HISTORY OF DVT (DEEP VEIN THROMBOSIS): ICD-10-CM

## 2024-12-04 DIAGNOSIS — D68.9 CLOTTING DISORDER: ICD-10-CM

## 2024-12-04 LAB
BASOPHILS # BLD AUTO: 0.12 10*3/MM3 (ref 0–0.2)
BASOPHILS NFR BLD AUTO: 1.2 % (ref 0–1.5)
DEPRECATED RDW RBC AUTO: 40.1 FL (ref 37–54)
EOSINOPHIL # BLD AUTO: 0.42 10*3/MM3 (ref 0–0.4)
EOSINOPHIL NFR BLD AUTO: 4.3 % (ref 0.3–6.2)
ERYTHROCYTE [DISTWIDTH] IN BLOOD BY AUTOMATED COUNT: 12.2 % (ref 12.3–15.4)
HCT VFR BLD AUTO: 46.5 % (ref 37.5–51)
HGB BLD-MCNC: 15.7 G/DL (ref 13–17.7)
IMM GRANULOCYTES # BLD AUTO: 0.05 10*3/MM3 (ref 0–0.05)
IMM GRANULOCYTES NFR BLD AUTO: 0.5 % (ref 0–0.5)
LYMPHOCYTES # BLD AUTO: 2.6 10*3/MM3 (ref 0.7–3.1)
LYMPHOCYTES NFR BLD AUTO: 26.3 % (ref 19.6–45.3)
MCH RBC QN AUTO: 29.9 PG (ref 26.6–33)
MCHC RBC AUTO-ENTMCNC: 33.8 G/DL (ref 31.5–35.7)
MCV RBC AUTO: 88.6 FL (ref 79–97)
MONOCYTES # BLD AUTO: 0.7 10*3/MM3 (ref 0.1–0.9)
MONOCYTES NFR BLD AUTO: 7.1 % (ref 5–12)
NEUTROPHILS NFR BLD AUTO: 5.98 10*3/MM3 (ref 1.7–7)
NEUTROPHILS NFR BLD AUTO: 60.6 % (ref 42.7–76)
NRBC BLD AUTO-RTO: 0 /100 WBC (ref 0–0.2)
PLATELET # BLD AUTO: 234 10*3/MM3 (ref 140–450)
PMV BLD AUTO: 10 FL (ref 6–12)
RBC # BLD AUTO: 5.25 10*6/MM3 (ref 4.14–5.8)
WBC NRBC COR # BLD AUTO: 9.87 10*3/MM3 (ref 3.4–10.8)

## 2024-12-04 PROCEDURE — 36415 COLL VENOUS BLD VENIPUNCTURE: CPT

## 2024-12-04 PROCEDURE — 85025 COMPLETE CBC W/AUTO DIFF WBC: CPT

## 2024-12-05 ENCOUNTER — OFFICE VISIT (OUTPATIENT)
Dept: ORTHOPEDIC SURGERY | Facility: CLINIC | Age: 54
End: 2024-12-05
Payer: COMMERCIAL

## 2024-12-05 VITALS — HEIGHT: 71 IN | TEMPERATURE: 98.6 F | BODY MASS INDEX: 36.96 KG/M2 | WEIGHT: 264 LBS

## 2024-12-05 DIAGNOSIS — M17.11 PRIMARY OSTEOARTHRITIS OF RIGHT KNEE: Primary | ICD-10-CM

## 2024-12-05 RX ORDER — METHYLPREDNISOLONE ACETATE 80 MG/ML
1 INJECTION, SUSPENSION INTRA-ARTICULAR; INTRALESIONAL; INTRAMUSCULAR; SOFT TISSUE
Status: COMPLETED | OUTPATIENT
Start: 2024-12-05 | End: 2024-12-05

## 2024-12-05 RX ORDER — LIDOCAINE HYDROCHLORIDE 10 MG/ML
2 INJECTION, SOLUTION EPIDURAL; INFILTRATION; INTRACAUDAL; PERINEURAL
Status: COMPLETED | OUTPATIENT
Start: 2024-12-05 | End: 2024-12-05

## 2024-12-05 RX ADMIN — METHYLPREDNISOLONE ACETATE 1 ML: 80 INJECTION, SUSPENSION INTRA-ARTICULAR; INTRALESIONAL; INTRAMUSCULAR; SOFT TISSUE at 08:49

## 2024-12-05 RX ADMIN — LIDOCAINE HYDROCHLORIDE 2 ML: 10 INJECTION, SOLUTION EPIDURAL; INFILTRATION; INTRACAUDAL; PERINEURAL at 08:49

## 2025-01-14 RX ORDER — VALACYCLOVIR HYDROCHLORIDE 1 G/1
2000 TABLET, FILM COATED ORAL 2 TIMES DAILY
Qty: 4 TABLET | Refills: 2 | Status: SHIPPED | OUTPATIENT
Start: 2025-01-14 | End: 2025-01-15

## 2025-01-17 ENCOUNTER — PATIENT ROUNDING (BHMG ONLY) (OUTPATIENT)
Dept: URGENT CARE | Facility: CLINIC | Age: 55
End: 2025-01-17
Payer: COMMERCIAL

## 2025-01-17 NOTE — ED NOTES
Thank you for letting us care for you during your recent visit to [facility]. We would love to hear about your experience with us. Was this the first time you have visited our location?    We’re always looking for ways to make our patients’ experiences even better. Do you have any recommendations on ways we may improve?    I appreciate you taking the time to respond. Please be on the lookout for a survey about your recent visit from [employer] via text or email. We would greatly appreciate if you could fill that out and turn it back in. We want your voice to be heard and we value your feedback.     Thank you for choosing [facility] for your healthcare needs.

## 2025-01-20 DIAGNOSIS — R82.79 POSITIVE URINE CULTURE: Primary | ICD-10-CM

## 2025-01-20 DIAGNOSIS — A49.8 ENTEROCOCCUS FAECALIS INFECTION: ICD-10-CM

## 2025-01-20 RX ORDER — CIPROFLOXACIN 500 MG/1
500 TABLET, FILM COATED ORAL 2 TIMES DAILY
Qty: 14 TABLET | Refills: 0 | Status: SHIPPED | OUTPATIENT
Start: 2025-01-20 | End: 2025-01-27

## 2025-04-02 ENCOUNTER — OFFICE VISIT (OUTPATIENT)
Dept: FAMILY MEDICINE CLINIC | Facility: CLINIC | Age: 55
End: 2025-04-02
Payer: COMMERCIAL

## 2025-04-02 VITALS
OXYGEN SATURATION: 96 % | DIASTOLIC BLOOD PRESSURE: 80 MMHG | WEIGHT: 261.2 LBS | TEMPERATURE: 98.3 F | SYSTOLIC BLOOD PRESSURE: 110 MMHG | BODY MASS INDEX: 36.57 KG/M2 | HEIGHT: 71 IN | HEART RATE: 75 BPM

## 2025-04-02 DIAGNOSIS — R00.0 TACHYCARDIA: ICD-10-CM

## 2025-04-02 DIAGNOSIS — Z00.00 ANNUAL PHYSICAL EXAM: Primary | ICD-10-CM

## 2025-04-02 DIAGNOSIS — H61.22 IMPACTED CERUMEN OF LEFT EAR: ICD-10-CM

## 2025-04-02 DIAGNOSIS — Z12.5 SCREENING FOR PROSTATE CANCER: ICD-10-CM

## 2025-04-02 DIAGNOSIS — R07.2 PRECORDIAL PAIN: ICD-10-CM

## 2025-04-02 DIAGNOSIS — R06.02 SHORTNESS OF BREATH: ICD-10-CM

## 2025-04-02 RX ORDER — VALACYCLOVIR HYDROCHLORIDE 1 G/1
TABLET, FILM COATED ORAL
COMMUNITY
Start: 2025-01-15

## 2025-04-02 NOTE — PROGRESS NOTES
Chief Complaint  Annual Exam    Subjective          History of Present Illness       Darshan Frias 55 y.o. male presents for an Annual Wellness Visit. He has been feeling well. I reviewed health maintenance with him as part of my preventative care plan.    Vision exam:Up to date.   Dental exam: Up to date.   Exercise: exercises 3 times per week  Alcohol: social drinker  Tobacco: The patient denies current or previous tobacco use.    Mr. Frias reports intermittent increased heart rate. Symptom onset was 2 months ago. Heart rate will increase up to 140 bpm and will return to normal after around 10-15 minutes. He also reports random shortness of breath with and without activity. He has had 6 episodes of left-sided chest pain over the past 2 months. The pain is described as sharp and will last for 15-20 minutes and then resolve. Deep breathing helps to relieve the chest pain. He denies a personal history of heart disease. He reports a possible history of heart disease with his biological mother that passed away from a MI. He drinks 2-3 sodas per day. No other sources of caffeine.         Review of Systems   Constitutional:  Negative for appetite change, chills, fatigue and fever.   HENT:  Positive for hearing loss (Decreased hearing, left ear). Negative for ear discharge, ear pain and trouble swallowing.    Eyes:  Negative for visual disturbance.   Respiratory:  Positive for shortness of breath. Negative for cough, chest tightness and wheezing.    Cardiovascular:  Positive for chest pain and palpitations. Negative for leg swelling.   Gastrointestinal:  Negative for abdominal pain, constipation, diarrhea, nausea and vomiting.   Genitourinary:  Negative for dysuria, frequency and urgency.   Musculoskeletal:  Negative for gait problem, myalgias and neck pain.   Skin:  Negative for rash.   Neurological:  Negative for dizziness, syncope, weakness and light-headedness.   Psychiatric/Behavioral:  Negative for dysphoric  "mood. The patient is not nervous/anxious.         Objective   Vital Signs:   /80 (BP Location: Left arm, Patient Position: Sitting, Cuff Size: Large Adult)   Pulse 75   Temp 98.3 °F (36.8 °C) (Oral)   Ht 180.3 cm (71\")   Wt 118 kg (261 lb 3.2 oz)   SpO2 96%   BMI 36.43 kg/m²      Class 2 Severe Obesity (BMI >=35 and <=39.9). Obesity-related health conditions include the following: dyslipidemias. Obesity is improving with lifestyle modifications. BMI is is above average; BMI management plan is completed. We discussed portion control and increasing exercise.       Physical Exam  Vitals and nursing note reviewed.   Constitutional:       General: He is awake. He is not in acute distress.     Appearance: Normal appearance. He is obese.   HENT:      Head: Normocephalic and atraumatic.      Right Ear: Ear canal normal. No decreased hearing noted. No drainage, swelling or tenderness. There is no impacted cerumen. Tympanic membrane is not erythematous, retracted or bulging.      Left Ear: Decreased hearing noted. No drainage, swelling or tenderness. There is impacted cerumen. Tympanic membrane is not erythematous, retracted or bulging.      Nose: Nose normal.      Right Turbinates: Not enlarged.      Left Turbinates: Not enlarged.      Mouth/Throat:      Lips: Pink.      Mouth: Mucous membranes are moist.      Tongue: No lesions. Tongue does not deviate from midline.      Pharynx: Oropharynx is clear.   Eyes:      General: Lids are normal. Vision grossly intact. No scleral icterus.     Extraocular Movements: Extraocular movements intact.      Conjunctiva/sclera: Conjunctivae normal.      Pupils: Pupils are equal, round, and reactive to light.   Neck:      Thyroid: No thyroid mass or thyromegaly.      Vascular: No carotid bruit.      Trachea: Trachea normal. No tracheal deviation.   Cardiovascular:      Rate and Rhythm: Normal rate and regular rhythm.      Pulses: Normal pulses.      Heart sounds: Normal heart " sounds.   Pulmonary:      Effort: Pulmonary effort is normal. No respiratory distress.      Breath sounds: Normal breath sounds. No decreased breath sounds, wheezing, rhonchi or rales.   Chest:   Breasts:     Right: Normal. No swelling.      Left: Normal. No swelling.   Abdominal:      General: Bowel sounds are normal. There is no distension.      Palpations: Abdomen is soft. There is no mass.      Tenderness: There is no abdominal tenderness. There is no guarding or rebound.   Musculoskeletal:         General: No swelling or tenderness. Normal range of motion.      Cervical back: Full passive range of motion without pain, normal range of motion and neck supple. No edema or erythema. Normal range of motion.      Right lower leg: No edema.      Left lower leg: No edema.   Lymphadenopathy:      Head:      Right side of head: No submental, submandibular, tonsillar, preauricular, posterior auricular or occipital adenopathy.      Left side of head: No submental, submandibular, tonsillar, preauricular, posterior auricular or occipital adenopathy.      Cervical:      Right cervical: No superficial, deep or posterior cervical adenopathy.     Left cervical: No superficial, deep or posterior cervical adenopathy.      Upper Body:      Right upper body: No supraclavicular adenopathy.      Left upper body: No supraclavicular adenopathy.   Skin:     General: Skin is warm and dry.      Capillary Refill: Capillary refill takes less than 2 seconds.      Coloration: Skin is not pale.      Findings: No rash.   Neurological:      General: No focal deficit present.      Mental Status: He is alert and oriented to person, place, and time.      Sensory: Sensation is intact.      Motor: No weakness, abnormal muscle tone or pronator drift.      Coordination: Coordination normal.      Gait: Gait and tandem walk normal.      Deep Tendon Reflexes: Reflexes normal.      Reflex Scores:       Bicep reflexes are 2+ on the right side and 2+ on the  left side.       Brachioradialis reflexes are 2+ on the right side and 2+ on the left side.       Patellar reflexes are 2+ on the right side and 2+ on the left side.       Achilles reflexes are 2+ on the right side and 2+ on the left side.  Psychiatric:         Attention and Perception: Attention normal.         Mood and Affect: Mood normal.         Behavior: Behavior normal.         Cognition and Memory: Cognition normal.                    Results        ECG 12 Lead    Date/Time: 4/2/2025 5:50 PM  Performed by: Catrachita Leung APRN    Authorized by: Catrachita Leung APRN                Assessment and Plan                 Annual physical exam    Orders:    Comprehensive metabolic panel    Lipid panel    CBC and Differential    TSH    ECG 12 Lead    Precordial pain    Orders:    Comprehensive metabolic panel    Lipid panel    CBC and Differential    TSH    ECG 12 Lead    Tachycardia    Orders:    Comprehensive metabolic panel    Lipid panel    CBC and Differential    TSH    ECG 12 Lead    Shortness of breath    Orders:    Comprehensive metabolic panel    Lipid panel    CBC and Differential    TSH    ECG 12 Lead    Screening for prostate cancer    Orders:    PSA Screen    Impacted cerumen of left ear                  Follow Up     Return if symptoms worsen or fail to improve.    Patient was given instructions and counseling regarding his condition or for health maintenance advice. Please see specific information pulled into the AVS if appropriate.

## 2025-04-03 DIAGNOSIS — R97.20 RISING PSA LEVEL: ICD-10-CM

## 2025-04-03 DIAGNOSIS — D72.829 LEUKOCYTOSIS, UNSPECIFIED TYPE: Primary | ICD-10-CM

## 2025-04-03 DIAGNOSIS — E78.49 OTHER HYPERLIPIDEMIA: ICD-10-CM

## 2025-04-03 LAB
ALBUMIN SERPL-MCNC: 4.3 G/DL (ref 3.5–5.2)
ALBUMIN/GLOB SERPL: 1.3 G/DL
ALP SERPL-CCNC: 119 U/L (ref 39–117)
ALT SERPL-CCNC: 33 U/L (ref 1–41)
AST SERPL-CCNC: 26 U/L (ref 1–40)
BASOPHILS # BLD AUTO: 0.08 10*3/MM3 (ref 0–0.2)
BASOPHILS NFR BLD AUTO: 0.7 % (ref 0–1.5)
BILIRUB SERPL-MCNC: 0.7 MG/DL (ref 0–1.2)
BUN SERPL-MCNC: 13 MG/DL (ref 6–20)
BUN/CREAT SERPL: 13.7 (ref 7–25)
CALCIUM SERPL-MCNC: 9.7 MG/DL (ref 8.6–10.5)
CHLORIDE SERPL-SCNC: 100 MMOL/L (ref 98–107)
CHOLEST SERPL-MCNC: 241 MG/DL (ref 0–200)
CO2 SERPL-SCNC: 24.4 MMOL/L (ref 22–29)
CREAT SERPL-MCNC: 0.95 MG/DL (ref 0.76–1.27)
EGFRCR SERPLBLD CKD-EPI 2021: 94.5 ML/MIN/1.73
EOSINOPHIL # BLD AUTO: 0.33 10*3/MM3 (ref 0–0.4)
EOSINOPHIL NFR BLD AUTO: 2.7 % (ref 0.3–6.2)
ERYTHROCYTE [DISTWIDTH] IN BLOOD BY AUTOMATED COUNT: 12.7 % (ref 12.3–15.4)
GLOBULIN SER CALC-MCNC: 3.3 GM/DL
GLUCOSE SERPL-MCNC: 80 MG/DL (ref 65–99)
HCT VFR BLD AUTO: 47.3 % (ref 37.5–51)
HDLC SERPL-MCNC: 52 MG/DL (ref 40–60)
HGB BLD-MCNC: 15.6 G/DL (ref 13–17.7)
IMM GRANULOCYTES # BLD AUTO: 0.04 10*3/MM3 (ref 0–0.05)
IMM GRANULOCYTES NFR BLD AUTO: 0.3 % (ref 0–0.5)
LDLC SERPL CALC-MCNC: 176 MG/DL (ref 0–100)
LYMPHOCYTES # BLD AUTO: 2.71 10*3/MM3 (ref 0.7–3.1)
LYMPHOCYTES NFR BLD AUTO: 22.1 % (ref 19.6–45.3)
MCH RBC QN AUTO: 29.7 PG (ref 26.6–33)
MCHC RBC AUTO-ENTMCNC: 33 G/DL (ref 31.5–35.7)
MCV RBC AUTO: 89.9 FL (ref 79–97)
MONOCYTES # BLD AUTO: 0.84 10*3/MM3 (ref 0.1–0.9)
MONOCYTES NFR BLD AUTO: 6.9 % (ref 5–12)
NEUTROPHILS # BLD AUTO: 8.25 10*3/MM3 (ref 1.7–7)
NEUTROPHILS NFR BLD AUTO: 67.3 % (ref 42.7–76)
NRBC BLD AUTO-RTO: 0 /100 WBC (ref 0–0.2)
PLATELET # BLD AUTO: 300 10*3/MM3 (ref 140–450)
POTASSIUM SERPL-SCNC: 4.4 MMOL/L (ref 3.5–5.2)
PROT SERPL-MCNC: 7.6 G/DL (ref 6–8.5)
PSA SERPL-MCNC: 2.67 NG/ML (ref 0–4)
RBC # BLD AUTO: 5.26 10*6/MM3 (ref 4.14–5.8)
SODIUM SERPL-SCNC: 136 MMOL/L (ref 136–145)
TRIGL SERPL-MCNC: 76 MG/DL (ref 0–150)
TSH SERPL DL<=0.005 MIU/L-ACNC: 1.12 UIU/ML (ref 0.27–4.2)
VLDLC SERPL CALC-MCNC: 13 MG/DL (ref 5–40)
WBC # BLD AUTO: 12.25 10*3/MM3 (ref 3.4–10.8)

## 2025-04-03 RX ORDER — ROSUVASTATIN CALCIUM 10 MG/1
10 TABLET, COATED ORAL NIGHTLY
Qty: 90 TABLET | Refills: 1 | Status: SHIPPED | OUTPATIENT
Start: 2025-04-03

## 2025-04-24 ENCOUNTER — TELEPHONE (OUTPATIENT)
Dept: CARDIOLOGY | Age: 55
End: 2025-04-24

## 2025-04-24 ENCOUNTER — TELEPHONE (OUTPATIENT)
Dept: FAMILY MEDICINE CLINIC | Facility: CLINIC | Age: 55
End: 2025-04-24
Payer: COMMERCIAL

## 2025-04-24 NOTE — TELEPHONE ENCOUNTER
Caller: JORDIN    Relationship:     Best call back number: [phone]      What was the call regarding: CARDIO CALLED PCP TO GET COPY OF EKG TESTING. THEY DO NOT HAVE A COPY TO PROVIDE.

## 2025-04-24 NOTE — TELEPHONE ENCOUNTER
Spoke with the Hub at [facility] [place] [facility] to inform their office that their is no EKG in the patient chart nor is their one store in our EKG machine. Hub has sent message to inform

## 2025-04-24 NOTE — TELEPHONE ENCOUNTER
Caller: [facility]    Relationship:     Best call back number: [phone]    Who are you requesting to speak with (clinical staff, provider,  specific staff member): VALERIO STILL      What was the call regarding: PATIENT IS BEING SEEN IN OFFICE TOMORROW AND THEIR LAST EKG READING IS NOT SHOWING UP ON CHART. PLEASE FAX A COPY [phone]    Is it okay if the provider responds through MyChart: PHONE CALL IF NEEDED

## 2025-04-25 ENCOUNTER — OFFICE VISIT (OUTPATIENT)
Dept: CARDIOLOGY | Age: 55
End: 2025-04-25
Payer: COMMERCIAL

## 2025-04-25 VITALS
HEIGHT: 71 IN | BODY MASS INDEX: 36.82 KG/M2 | WEIGHT: 263 LBS | DIASTOLIC BLOOD PRESSURE: 80 MMHG | HEART RATE: 74 BPM | SYSTOLIC BLOOD PRESSURE: 122 MMHG

## 2025-04-25 DIAGNOSIS — R00.2 PALPITATIONS: Primary | ICD-10-CM

## 2025-04-25 PROCEDURE — 99204 OFFICE O/P NEW MOD 45 MIN: CPT | Performed by: INTERNAL MEDICINE

## 2025-04-25 NOTE — PROGRESS NOTES
"      CARDIOLOGY    Doc Riggs MD    ENCOUNTER DATE:  04/25/2025    Darshan Frias / 55 y.o. / male        CHIEF COMPLAINT / REASON FOR OFFICE VISIT     Shortness of Breath, Chest Pain, and Rapid Heart Rate      HISTORY OF PRESENT ILLNESS       Shortness of Breath  Associated symptoms include chest pain.   Chest Pain   Associated symptoms include shortness of breath.     Darshan Frias is a 55 y.o. male who presents today for consultation.  Patient presents having episodes of increasing heart rates.  He says it started earlier this year but he has not really had an episode now in a month.  He said that his heart rate will  to about 140 as measured on his Fitbit.  He says that he then gets an episode of sharp discomfort substernally that can sometimes last up to 10 minutes.  He says he is getting more these episodes about every week or 2.  He had an upper respiratory infection about a month ago but these episodes do precede that.  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  He does not have ECG capability on his Fitbit.      The following portions of the patient's history were reviewed and updated as appropriate: allergies, current medications, past family history, past medical history, past social history, past surgical history and problem list.      VITAL SIGNS     Visit Vitals  /80 (BP Location: Left arm)   Pulse 74   Ht 180.3 cm (71\")   Wt 119 kg (263 lb)   BMI 36.68 kg/m²         Wt Readings from Last 3 Encounters:   04/25/25 119 kg (263 lb)   04/02/25 118 kg (261 lb 3.2 oz)   01/16/25 120 kg (264 lb 8.8 oz)     Body mass index is 36.68 kg/m².      REVIEW OF SYSTEMS   Review of Systems   Cardiovascular:  Positive for chest pain.   Respiratory:  Positive for shortness of breath.            PHYSICAL EXAMINATION     Vitals reviewed.   Constitutional:       Appearance: Healthy appearance.   Pulmonary:      Effort: Pulmonary effort is normal. "   Cardiovascular:      Normal rate. Regular rhythm. Normal S1. Normal S2.       Murmurs: There is no murmur.      No gallop.  No click. No rub.   Pulses:     Intact distal pulses.   Edema:     Peripheral edema absent.   Neurological:      Mental Status: Alert.           REVIEWED DATA     Procedures    Cardiac Procedures:      Lipid Panel          7/26/2024    13:04 4/2/2025    16:00   Lipid Panel   Total Cholesterol 248  241    Triglycerides 113  76    HDL Cholesterol 54  52    VLDL Cholesterol 20  13    LDL Cholesterol  174  176          ASSESSMENT & PLAN      Diagnosis Plan   1. Palpitations  Adult Transthoracic Echo Complete W/ Cont if Necessary Per Protocol    Ambulatory Referral to Sleep Medicine            SUMMARY/DISCUSSION  Palpitations.  It is obviously concerning that his heart rate is picking up to the 140 range.  Patient does snore so I am going to refer him to a sleep evaluation.  Because of the palpitations I am also going to set him up for an echocardiogram to rule out structural heart disease.  I did also encourage him either to get an Apple Watch or a Splother mobile or some other device like this to see if we can capture a rhythm strip while he is having episodes.  He does have an order for 48-hour monitor however since the episodes are occurring infrequently I doubt that this is going to be beneficial.  Will see what the studies show follow back up in 3 months for reevaluation.  I also encouraged the patient to start exercising that may help some of the symptoms.        MEDICATIONS         Discharge Medications            Accurate as of April 25, 2025  4:13 PM. If you have any questions, ask your nurse or doctor.                Continue These Medications        Instructions Start Date   apixaban 2.5 MG tablet tablet  Commonly known as: ELIQUIS   2.5 mg, Oral, 2 Times Daily      rosuvastatin 10 MG tablet  Commonly known as: CRESTOR   10 mg, Oral, Nightly      tadalafil 10 MG tablet  Commonly known  as: Cialis   10 mg, Oral, Daily PRN      valACYclovir 1000 MG tablet  Commonly known as: VALTREX   TAKE 2 TABLETS BY MOUTH TWICE DAILY FOR 1 DAY                   **Dragon Disclaimer:   Much of this encounter note is an electronic transcription/translation of spoken language to printed text. The electronic translation of spoken language may permit erroneous, or at times, nonsensical words or phrases to be inadvertently transcribed. Although I have reviewed the note for such errors, some may still exist.

## 2025-05-01 RX ORDER — TADALAFIL 10 MG/1
10 TABLET ORAL DAILY PRN
Qty: 30 TABLET | Refills: 5 | Status: SHIPPED | OUTPATIENT
Start: 2025-05-01

## 2025-05-01 NOTE — TELEPHONE ENCOUNTER
Rx Refill Note  Requested Prescriptions     Pending Prescriptions Disp Refills    tadalafil (CIALIS) 10 MG tablet [Pharmacy Med Name: TADALAFIL 10MG      TAB] 30 tablet 0     Sig: TAKE 1 TABLET BY MOUTH ONCE DAILY AS NEEDED FOR ERECTILE DYSFUNCTION      Last office visit with prescribing clinician: 4/2/2025   Last telemedicine visit with prescribing clinician: Visit date not found   Next office visit with prescribing clinician: Visit date not found       MOON Roman(R)  05/01/25, 15:17 EDT

## 2025-05-12 NOTE — PROGRESS NOTES
New Shoulder      Patient: Darshan Frias        YOB: 1970    Medical Record Number: [id number]        Chief Complaints: Right shoulder pain      History of Present Illness: This is a 55-year-old male who presents complaining of right shoulder pain is been ongoing 2 to 3 months its gotten worse no 1 history injury change in activity I last saw him for the right shoulder he does feel like he is lost some range of motion he is on chronic Eliquis    Allergies: No Known Allergies    Medications:   Home Medications:  Current Outpatient Medications on File Prior to Visit   Medication Sig    apixaban (ELIQUIS) 2.5 MG tablet tablet Take 1 tablet by mouth 2 (Two) Times a Day.    rosuvastatin (CRESTOR) 10 MG tablet Take 1 tablet by mouth Every Night.    tadalafil (CIALIS) 10 MG tablet TAKE 1 TABLET BY MOUTH ONCE DAILY AS NEEDED FOR ERECTILE DYSFUNCTION    valACYclovir (VALTREX) 1000 MG tablet TAKE 2 TABLETS BY MOUTH TWICE DAILY FOR 1 DAY     No current facility-administered medications on file prior to visit.     Current Medications:  Scheduled Meds:  Continuous Infusions:No current facility-administered medications for this visit.    PRN Meds:.    Past Medical History:   Diagnosis Date    Deep vein thrombosis     History of erectile dysfunction     Knee swelling     Right shoulder pain     15 years    Tendinitis of knee         Past Surgical History:   Procedure Laterality Date    COLONOSCOPY  2020    Dr. Ernst        Social History     Occupational History     Employer: [employer]   Tobacco Use    Smoking status: Never     Passive exposure: Never    Smokeless tobacco: Never   Vaping Use    Vaping status: Never Used   Substance and Sexual Activity    Alcohol use: Yes     Alcohol/week: 6.0 standard drinks of alcohol     Types: 6 Cans of beer per week     Comment: 6 weekly    Drug use: Never    Sexual activity: Yes     Partners: Female      Social History     Social History Narrative    Not on  "file        Family History   Problem Relation Age of Onset    Heart disease Father     Breast cancer Sister     Leukemia Sister     Cancer Sister         Passed away 2023    Clotting disorder Sister              Review of Systems:     Review of Systems      Physical Exam: 55 y.o. male  General Appearance:    Alert, cooperative, in no acute distress                   Vitals:    05/13/25 1524   Temp: 98 °F (36.7 °C)   TempSrc: Temporal   Weight: 121 kg (265 lb 12.8 oz)   Height: 180.3 cm (71\")   PainSc: 8    PainLoc: Shoulder      Patient is alert and read ×3 no acute distress appears her above-listed at height weight and age.  Affect is normal respiratory rate is normal unlabored. Heart rate regular rate rhythm, sclera, dentition and hearing are normal for the purpose of this exam.    Ortho Exam  Physical exam of the right shoulder reveals no overlying skin changes no lymphedema no lymphadenopathy.  Patient has active flexion 170 with mild symptoms abduction is similar external rotation is to 50 and internal rotation to the lower lumbar spine with mild symptoms.  Patient has good rotator cuff strength 4+ over 5 with isometric strength testing with pain.  Patient has a positive impingement and a positive Rodriguez sign.  Patient has good cervical range of motion which is full and asymptomatic no radicular symptoms.  Patient has a normal elbow exam.  Good distal pulses are present  Patient has pain with overhead activity and a positive Neer sign and a positive empty can sign , a positive drop arm and a definitive painful arc   Large Joint Arthrocentesis: R glenohumeral  Date/Time: 5/13/2025 3:52 PM  Consent given by: patient  Site marked: site marked  Timeout: Immediately prior to procedure a time out was called to verify the correct patient, procedure, equipment, support staff and site/side marked as required   Supporting Documentation  Indications: pain   Procedure Details  Location: shoulder - R " glenohumeral  Preparation: Patient was prepped and draped in the usual sterile fashion  Needle gauge: 21G.  Approach: posterior  Medications administered: 2 mL lidocaine PF 1% 1 %; 80 mg methylPREDNISolone acetate 40 MG/ML  Patient tolerance: patient tolerated the procedure well with no immediate complications              Radiology:   AP, Scapular Y and Axillary Lateral of the right shoulder were ordered/reviewed to evauate shoulder pain.  I have compared to previous films he has a little bit of acromioclavicular arthritis otherwise no acute bony pathology  Imaging Results (Most Recent)       Procedure Component Value Units Date/Time    XR Shoulder 2+ View Right [[id number]] Resulted: 05/13/25 1549     Updated: 05/13/25 1549    Impression:      Ordering physician's impression is located in the Encounter Note dated 05/13/25. X-ray performed in the DR room.            Assessment/Plan: Right shoulder pain I think this is early capsulitis plan is to proceed with an injection as a diagnostic and therapeutic tool I will have him see physical therapy to work on his range of motion and strengthening I will see him back 5 weeks he is on chronic Eliquis he understands his risk are little higher he knows to be diligent with ice

## 2025-05-13 ENCOUNTER — OFFICE VISIT (OUTPATIENT)
Dept: ORTHOPEDIC SURGERY | Facility: CLINIC | Age: 55
End: 2025-05-13
Payer: COMMERCIAL

## 2025-05-13 VITALS — WEIGHT: 265.8 LBS | HEIGHT: 71 IN | TEMPERATURE: 98 F | BODY MASS INDEX: 37.21 KG/M2

## 2025-05-13 DIAGNOSIS — M75.01 ADHESIVE CAPSULITIS OF RIGHT SHOULDER: Primary | ICD-10-CM

## 2025-05-13 RX ADMIN — LIDOCAINE HYDROCHLORIDE 2 ML: 10 INJECTION, SOLUTION EPIDURAL; INFILTRATION; INTRACAUDAL; PERINEURAL at 15:52

## 2025-05-13 RX ADMIN — METHYLPREDNISOLONE ACETATE 80 MG: 40 INJECTION, SUSPENSION INTRA-ARTICULAR; INTRALESIONAL; INTRAMUSCULAR; SOFT TISSUE at 15:52

## 2025-05-14 RX ORDER — METHYLPREDNISOLONE ACETATE 40 MG/ML
80 INJECTION, SUSPENSION INTRA-ARTICULAR; INTRALESIONAL; INTRAMUSCULAR; SOFT TISSUE
Status: COMPLETED | OUTPATIENT
Start: 2025-05-13 | End: 2025-05-13

## 2025-05-14 RX ORDER — LIDOCAINE HYDROCHLORIDE 10 MG/ML
2 INJECTION, SOLUTION EPIDURAL; INFILTRATION; INTRACAUDAL; PERINEURAL
Status: COMPLETED | OUTPATIENT
Start: 2025-05-13 | End: 2025-05-13

## 2025-05-15 ENCOUNTER — HOSPITAL ENCOUNTER (OUTPATIENT)
Dept: CARDIOLOGY | Facility: HOSPITAL | Age: 55
Discharge: HOME | End: 2025-05-15
Admitting: INTERNAL MEDICINE
Payer: COMMERCIAL

## 2025-05-15 VITALS
DIASTOLIC BLOOD PRESSURE: 90 MMHG | SYSTOLIC BLOOD PRESSURE: 148 MMHG | OXYGEN SATURATION: 93 % | WEIGHT: 265 LBS | BODY MASS INDEX: 37.1 KG/M2 | HEIGHT: 71 IN | HEART RATE: 70 BPM

## 2025-05-15 DIAGNOSIS — R00.2 PALPITATIONS: ICD-10-CM

## 2025-05-15 LAB
AORTIC ARCH: 3.1 CM
AORTIC DIMENSIONLESS INDEX: 0.89 (DI)
ASCENDING AORTA: 3.5 CM
AV MEAN PRESS GRAD SYS DOP V1V2: 4 MMHG
AV VMAX SYS DOP: 126 CM/SEC
BH CV ECHO MEAS - ACS: 2.07 CM
BH CV ECHO MEAS - AO MAX PG: 6.4 MMHG
BH CV ECHO MEAS - AO ROOT AREA (BSA CORRECTED): 1.2 CM2
BH CV ECHO MEAS - AO ROOT DIAM: 2.9 CM
BH CV ECHO MEAS - AO V2 VTI: 25.9 CM
BH CV ECHO MEAS - AVA(I,D): 2.8 CM2
BH CV ECHO MEAS - EDV(CUBED): 103.8 ML
BH CV ECHO MEAS - EDV(MOD-SP2): 145 ML
BH CV ECHO MEAS - EDV(MOD-SP4): 107 ML
BH CV ECHO MEAS - EF(MOD-SP2): 62.8 %
BH CV ECHO MEAS - EF(MOD-SP4): 52.3 %
BH CV ECHO MEAS - ESV(CUBED): 28 ML
BH CV ECHO MEAS - ESV(MOD-SP2): 54 ML
BH CV ECHO MEAS - ESV(MOD-SP4): 51 ML
BH CV ECHO MEAS - FS: 35.4 %
BH CV ECHO MEAS - IVS/LVPW: 0.82 CM
BH CV ECHO MEAS - IVSD: 0.9 CM
BH CV ECHO MEAS - LAT PEAK E' VEL: 8.6 CM/SEC
BH CV ECHO MEAS - LV DIASTOLIC VOL/BSA (35-75): 45 CM2
BH CV ECHO MEAS - LV MASS(C)D: 164.5 GRAMS
BH CV ECHO MEAS - LV MAX PG: 4.5 MMHG
BH CV ECHO MEAS - LV MEAN PG: 2 MMHG
BH CV ECHO MEAS - LV SYSTOLIC VOL/BSA (12-30): 21.5 CM2
BH CV ECHO MEAS - LV V1 MAX: 106 CM/SEC
BH CV ECHO MEAS - LV V1 VTI: 23 CM
BH CV ECHO MEAS - LVIDD: 4.7 CM
BH CV ECHO MEAS - LVIDS: 3 CM
BH CV ECHO MEAS - LVOT AREA: 3.1 CM2
BH CV ECHO MEAS - LVOT DIAM: 1.99 CM
BH CV ECHO MEAS - LVPWD: 1.1 CM
BH CV ECHO MEAS - MED PEAK E' VEL: 11.2 CM/SEC
BH CV ECHO MEAS - MV A DUR: 0.1 SEC
BH CV ECHO MEAS - MV A MAX VEL: 54.1 CM/SEC
BH CV ECHO MEAS - MV DEC SLOPE: 587.4 CM/SEC2
BH CV ECHO MEAS - MV DEC TIME: 0.16 SEC
BH CV ECHO MEAS - MV E MAX VEL: 79.8 CM/SEC
BH CV ECHO MEAS - MV E/A: 1.48
BH CV ECHO MEAS - MV MAX PG: 4.4 MMHG
BH CV ECHO MEAS - MV MEAN PG: 2.13 MMHG
BH CV ECHO MEAS - MV P1/2T: 50.1 MSEC
BH CV ECHO MEAS - MV V2 VTI: 35.2 CM
BH CV ECHO MEAS - MVA(P1/2T): 4.4 CM2
BH CV ECHO MEAS - MVA(VTI): 2.03 CM2
BH CV ECHO MEAS - PA ACC TIME: 0.1 SEC
BH CV ECHO MEAS - PA V2 MAX: 99.1 CM/SEC
BH CV ECHO MEAS - PULM A REVS DUR: 0.11 SEC
BH CV ECHO MEAS - PULM A REVS VEL: 27.4 CM/SEC
BH CV ECHO MEAS - PULM DIAS VEL: 25.3 CM/SEC
BH CV ECHO MEAS - PULM S/D: 2.22
BH CV ECHO MEAS - PULM SYS VEL: 56.1 CM/SEC
BH CV ECHO MEAS - QP/QS: 1.06
BH CV ECHO MEAS - RAP SYSTOLE: 3 MMHG
BH CV ECHO MEAS - RV MAX PG: 1.5 MMHG
BH CV ECHO MEAS - RV V1 MAX: 61.3 CM/SEC
BH CV ECHO MEAS - RV V1 VTI: 15.9 CM
BH CV ECHO MEAS - RVOT DIAM: 2.46 CM
BH CV ECHO MEAS - RVSP: 16.6 MMHG
BH CV ECHO MEAS - SV(LVOT): 71.4 ML
BH CV ECHO MEAS - SV(MOD-SP2): 91 ML
BH CV ECHO MEAS - SV(MOD-SP4): 56 ML
BH CV ECHO MEAS - SV(RVOT): 75.7 ML
BH CV ECHO MEAS - SVI(LVOT): 30.1 ML/M2
BH CV ECHO MEAS - SVI(MOD-SP2): 38.3 ML/M2
BH CV ECHO MEAS - SVI(MOD-SP4): 23.6 ML/M2
BH CV ECHO MEAS - TAPSE (>1.6): 2.09 CM
BH CV ECHO MEAS - TR MAX PG: 13.6 MMHG
BH CV ECHO MEAS - TR MAX VEL: 184.6 CM/SEC
BH CV ECHO MEASUREMENTS AVERAGE E/E' RATIO: 8.06
BH CV XLRA - RV BASE: 3.2 CM
BH CV XLRA - RV LENGTH: 8.3 CM
BH CV XLRA - RV MID: 3 CM
BH CV XLRA - TDI S': 11.1 CM/SEC
LEFT ATRIUM VOLUME INDEX: 23.1 ML/M2
LV EF BIPLANE MOD: 55.7 %
SINUS: 3.3 CM
STJ: 2.9 CM

## 2025-05-15 PROCEDURE — 25510000001 PERFLUTREN 6.52 MG/ML SUSPENSION 2 ML VIAL: Performed by: INTERNAL MEDICINE

## 2025-05-15 PROCEDURE — 93306 TTE W/DOPPLER COMPLETE: CPT

## 2025-05-15 RX ADMIN — PERFLUTREN 2 ML: 6.52 INJECTION, SUSPENSION INTRAVENOUS at 09:05

## 2025-06-02 ENCOUNTER — TREATMENT (OUTPATIENT)
Dept: PHYSICAL THERAPY | Facility: CLINIC | Age: 55
End: 2025-06-02
Payer: COMMERCIAL

## 2025-06-02 DIAGNOSIS — G89.29 CHRONIC RIGHT SHOULDER PAIN: ICD-10-CM

## 2025-06-02 DIAGNOSIS — M75.01 ADHESIVE CAPSULITIS OF RIGHT SHOULDER: Primary | ICD-10-CM

## 2025-06-02 DIAGNOSIS — M25.511 CHRONIC RIGHT SHOULDER PAIN: ICD-10-CM

## 2025-06-02 DIAGNOSIS — R26.89 DECREASED FUNCTIONAL MOBILITY: ICD-10-CM

## 2025-06-02 PROCEDURE — 97535 SELF CARE MNGMENT TRAINING: CPT | Performed by: PHYSICAL THERAPIST

## 2025-06-02 PROCEDURE — 97110 THERAPEUTIC EXERCISES: CPT | Performed by: PHYSICAL THERAPIST

## 2025-06-02 PROCEDURE — 97161 PT EVAL LOW COMPLEX 20 MIN: CPT | Performed by: PHYSICAL THERAPIST

## 2025-06-03 ENCOUNTER — OFFICE VISIT (OUTPATIENT)
Facility: HOSPITAL | Age: 55
End: 2025-06-03
Payer: COMMERCIAL

## 2025-06-03 VITALS
WEIGHT: 262 LBS | SYSTOLIC BLOOD PRESSURE: 121 MMHG | BODY MASS INDEX: 36.68 KG/M2 | HEIGHT: 71 IN | DIASTOLIC BLOOD PRESSURE: 80 MMHG | HEART RATE: 82 BPM | OXYGEN SATURATION: 96 %

## 2025-06-03 DIAGNOSIS — E66.9 OBESITY (BMI 30-39.9): ICD-10-CM

## 2025-06-03 DIAGNOSIS — R00.2 PALPITATIONS: ICD-10-CM

## 2025-06-03 DIAGNOSIS — G47.30 SLEEP APNEA, UNSPECIFIED TYPE: Primary | ICD-10-CM

## 2025-06-03 DIAGNOSIS — R06.83 SNORING: ICD-10-CM

## 2025-06-03 PROCEDURE — G0463 HOSPITAL OUTPT CLINIC VISIT: HCPCS

## 2025-06-03 NOTE — PROGRESS NOTES
[facility] New Patient[facility]Consultation       Reason for Consultation: Palpitations      Patient Care Team:  Catrachita Leung APRN as PCP - General (Family Medicine)  Susanne Richard MD as Referring Physician (Internal Medicine)  Rajesh Burnett MD PhD as Consulting Physician (Hematology and Oncology)  Germania Aaron MD as Consulting Physician (Sleep Medicine)      History of present illness:  Thank you for asking me to see your patient.  The patient is a 55 y.o. male presents today with concern for sleep disorder.  No history of prior sleep study or tonsillectomy.  Sleep latency is a few minutes sleeps 5 to 6 hours a night 0 naps no rotating shifts.  Reports snoring waking with dry mouth waking at 30 pounds over the past 5 years teeth grinding during sleep nocturia 2-3 times a night.  BMI 36.6. Remains on Eliquis; history of DVTs.  Per cardiology records patient reported having episodes of increased heart rate; as measured on Fitbit can  to about 140 bpm.  Echocardiogram was done and was reported to be stable.  EF 51 to 55%.    Medical Conditions (PMH):   Clotting disorder    Social history:  Do you drive a commercial vehicle:  No   Shift work:  No   Tobacco use:  No   Alcohol use: 6-8 per week  Caffeinated drinks: 2-3 per day  Occupation: Manager at [employer]    Family History (parents and siblings) (pertaining to sleep medicine):  Obesity    Allergies:  Patient has no known allergies.       Current Outpatient Medications:     apixaban (ELIQUIS) 2.5 MG tablet tablet, Take 1 tablet by mouth 2 (Two) Times a Day., Disp: 90 tablet, Rfl: 1    rosuvastatin (CRESTOR) 10 MG tablet, Take 1 tablet by mouth Every Night., Disp: 90 tablet, Rfl: 1    tadalafil (CIALIS) 10 MG tablet, TAKE 1 TABLET BY MOUTH ONCE DAILY AS NEEDED FOR ERECTILE DYSFUNCTION, Disp: 30 tablet, Rfl: 5    valACYclovir (VALTREX) 1000 MG tablet, TAKE 2 TABLETS BY MOUTH TWICE DAILY FOR 1 DAY, Disp: , Rfl:     Vital Signs:    Vitals:    06/03/25  "1424   BP: 121/80   Pulse: 82   SpO2: 96%   Weight: 119 kg (262 lb)   Height: 180.3 cm (70.98\")      Body mass index is 36.56 kg/m².  Neck Circumference: 20 inches      REVIEW OF SYSTEMS:  Pertinent positive symptoms are:  Snoring  [place] Sleepiness Scale of Total score: 7   Swollen ankles      Physical exam:  Vitals:    06/03/25 1424   BP: 121/80   Pulse: 82   SpO2: 96%   Weight: 119 kg (262 lb)   Height: 180.3 cm (70.98\")    Body mass index is 36.56 kg/m². Neck Circumference: 20 inches  HEENT: Head is atraumatic, normocephalic  Eyes: pupils are round equal and reacting to light and accommodation, conjunctiva normal  Throat: tongue normal  NECK:Neck Circumference: 20 inches  RESPIRATORY SYSTEM: Regular respirations  CARDIOVASULAR SYSTEM: Regular rate  EXTREMITES: No cyanosis, clubbing  NEUROLOGICAL SYSTEM: Oriented x 3, no gross motor defects, gait normal      Impression:  1. Sleep apnea, unspecified type    2. Palpitations    3. Snoring    4. Obesity (BMI 30-39.9)        Plan:    Office note(s) from care team reviewed. Office note(s) reviewed: 4/25/2025 cardiology    Labs/ Test Results Reviewed:  TSH          4/2/2025    16:00   TSH   TSH 1.120                  ASSESSMENT AND PLAN:   At risk for sleep apnea: patient's symptoms and physical examination are concerning for possible sleep apnea.   I discussed the signs, symptoms, and pathophysiology of sleep apnea with this patient.  I also discussed the potential complications of untreated sleep apnea including but not limited to resistant hypertension, insulin resistance, pulmonary hypertension, atrial fibrillation, heart attack, stroke, nonrestorative sleep with hypersomnia which can increase risk for motor vehicle accidents, etc.   Different testing methods including home-based and lab based sleep studies were discussed with this patient.   Based on patient history and physical examination, will proceed with HST.  The order for the sleep study is placed in Epic.  " The test will be scheduled after prior authorization has been obtained through patient's insurance.  Treatment and management will be discussed in more detail with this patient after the test is completed.  All questions were answered to patient's satisfaction.   Snoring: snoring is the sound created by turbulent airflow vibrating upper airway soft tissue.  I have also discussed factors affecting snoring including sleep deprivation, sleeping on the back and alcohol ingestion. To minimize snoring, patient is advised to have adequate sleep, sleep on their side, and avoid alcohol and sedative medications around bedtime.   [place] Sleepiness Scale of Total score: 7.  There are many causes of excessive daytime sleepiness.  Rule out sleep apnea as a contributing factor, as above.  Do not drive, operate heavy machinery, or do activities that require high concentration if feeling tired/drowsy.  Obesity: Body mass index is 36.56 kg/m².. Patients who are overweight or obese are at increased risk of sleep apnea/ sleep disordered breathing. Weight reduction and healthy lifestyle are encouraged in overweight/ obese patients as part of a comprehensive approach to sleep apnea treatment.    Palpitations: Continue care per cardiology    I have also discussed with the patient the following  Sleep hygiene: try to maintain a regular bed time and wake time, avoid watching TV/ using electronic devices in bed (including cell phones), limit caffeinated and alcoholic beverages before bed, try to maintain a cool and quiet sleep environment, avoid daytime naps  Adequate amount of sleep: most people need around 7 to 8 hours of sleep each night      Patient will follow-up after study, 31 to 90 days after PAP therapy initiated if applicable, or contact the office sooner for questions or concerns. Patient's questions were answered.      Thank you for allowing me to participate in your patient's care.    Germania Aaron MD  Sleep  Medicine  06/03/25  14:40 EDT

## 2025-06-04 ENCOUNTER — LAB (OUTPATIENT)
Dept: LAB | Facility: HOSPITAL | Age: 55
End: 2025-06-04
Payer: COMMERCIAL

## 2025-06-04 ENCOUNTER — OFFICE VISIT (OUTPATIENT)
Dept: ONCOLOGY | Facility: CLINIC | Age: 55
End: 2025-06-04
Payer: COMMERCIAL

## 2025-06-04 VITALS
WEIGHT: 261.5 LBS | BODY MASS INDEX: 36.61 KG/M2 | SYSTOLIC BLOOD PRESSURE: 127 MMHG | DIASTOLIC BLOOD PRESSURE: 82 MMHG | OXYGEN SATURATION: 94 % | HEIGHT: 71 IN | TEMPERATURE: 98.7 F | HEART RATE: 73 BPM

## 2025-06-04 DIAGNOSIS — D68.9 CLOTTING DISORDER: ICD-10-CM

## 2025-06-04 DIAGNOSIS — D68.52 PROTHROMBIN GENE MUTATION: ICD-10-CM

## 2025-06-04 DIAGNOSIS — D68.52 PROTHROMBIN GENE MUTATION: Primary | ICD-10-CM

## 2025-06-04 DIAGNOSIS — Z86.718 HISTORY OF DVT (DEEP VEIN THROMBOSIS): ICD-10-CM

## 2025-06-04 DIAGNOSIS — Z86.711 HISTORY OF PULMONARY EMBOLISM: ICD-10-CM

## 2025-06-04 DIAGNOSIS — Z79.01 CHRONIC ANTICOAGULATION: ICD-10-CM

## 2025-06-04 LAB
BASOPHILS # BLD AUTO: 0.08 10*3/MM3 (ref 0–0.2)
BASOPHILS NFR BLD AUTO: 1 % (ref 0–1.5)
DEPRECATED RDW RBC AUTO: 44.4 FL (ref 37–54)
EOSINOPHIL # BLD AUTO: 0.33 10*3/MM3 (ref 0–0.4)
EOSINOPHIL NFR BLD AUTO: 4.1 % (ref 0.3–6.2)
ERYTHROCYTE [DISTWIDTH] IN BLOOD BY AUTOMATED COUNT: 13.2 % (ref 12.3–15.4)
HCT VFR BLD AUTO: 45 % (ref 37.5–51)
HGB BLD-MCNC: 14.8 G/DL (ref 13–17.7)
IMM GRANULOCYTES # BLD AUTO: 0.02 10*3/MM3 (ref 0–0.05)
IMM GRANULOCYTES NFR BLD AUTO: 0.2 % (ref 0–0.5)
LYMPHOCYTES # BLD AUTO: 2.02 10*3/MM3 (ref 0.7–3.1)
LYMPHOCYTES NFR BLD AUTO: 24.9 % (ref 19.6–45.3)
MCH RBC QN AUTO: 30.1 PG (ref 26.6–33)
MCHC RBC AUTO-ENTMCNC: 32.9 G/DL (ref 31.5–35.7)
MCV RBC AUTO: 91.5 FL (ref 79–97)
MONOCYTES # BLD AUTO: 0.6 10*3/MM3 (ref 0.1–0.9)
MONOCYTES NFR BLD AUTO: 7.4 % (ref 5–12)
NEUTROPHILS NFR BLD AUTO: 5.07 10*3/MM3 (ref 1.7–7)
NEUTROPHILS NFR BLD AUTO: 62.4 % (ref 42.7–76)
NRBC BLD AUTO-RTO: 0 /100 WBC (ref 0–0.2)
PLATELET # BLD AUTO: 235 10*3/MM3 (ref 140–450)
PMV BLD AUTO: 9 FL (ref 6–12)
RBC # BLD AUTO: 4.92 10*6/MM3 (ref 4.14–5.8)
WBC NRBC COR # BLD AUTO: 8.12 10*3/MM3 (ref 3.4–10.8)

## 2025-06-04 PROCEDURE — 85025 COMPLETE CBC W/AUTO DIFF WBC: CPT

## 2025-06-04 PROCEDURE — 85303 CLOT INHIBIT PROT C ACTIVITY: CPT | Performed by: INTERNAL MEDICINE

## 2025-06-04 PROCEDURE — 85306 CLOT INHIBIT PROT S FREE: CPT | Performed by: INTERNAL MEDICINE

## 2025-06-04 PROCEDURE — 99214 OFFICE O/P EST MOD 30 MIN: CPT | Performed by: INTERNAL MEDICINE

## 2025-06-04 PROCEDURE — 36415 COLL VENOUS BLD VENIPUNCTURE: CPT

## 2025-06-04 NOTE — PROGRESS NOTES
.     REASON FOR FOLLOW-UP:     *. 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 The patient is on lifelong anticoagulation.                                HISTORY OF PRESENT ILLNESS:  The patient is a 55 y.o. year old male with history of recurrent DVT who returns today for follow-up visit.   History of Present Illness  The patient presented today 6/4/2025 for a 6-month follow-up evaluation due to recurrent lower extremity deep vein thrombosis (DVT). He was switched to Eliquis anticoagulation.    Patient reports that one of his legs appears slightly larger than the other, a condition he has consistently observed. He does not utilize elastic stockings as part of treatment regimen. He is currently on Eliquis 2.5 mg twice daily.    He has discontinued the use of Crestor, which initially caused him to experience body aches. His cholesterol levels have shown an increase, prompting starting medication. However, he has opted to manage his cholesterol through dietary modifications.    Results    Labs   - CBC: 06/04/2025, Normal. White blood cells count is 8120, Neutrophils count is 5070, Lymphocytes count is 2020. Hemoglobin is 14.8, Platelets count is 235,000,       Past Medical History:   Diagnosis Date    Deep vein thrombosis     History of erectile dysfunction     Knee swelling     Right shoulder pain     15 years    Tendinitis of knee      Past Surgical History:   Procedure Laterality Date    COLONOSCOPY  2020    Dr. Ernst       HEMATOLOGY HISTORY: Mr. Frias was a 43-year-old  male who presented on 5/7/2020 for initial evaluation because of recurrent right leg deep venous thrombosis and was diagnosed with some sort of hypercoagulable status for which I do not know the details even after I reviewed his previ  ous medical records from Dr. Luna's office.          This patient had recurrent right leg edema in late April 2013 and  he had venous duplex study on 4/22/13 which reported complete occlusion of the right superficial femoral vein and also popliteal vein.  Since   that time he has been taking Coumadin religiously and most recent outside lab 2/29/13 showed INR 1.6.  The patient reports no bleeding episodes. No Bruising.  He otherwise is at baseline condition with good performance status ECOG 0.          After the patient left, I received more medical records from Dr. Luna's office.  The venous duplex study dated 1/31/08 reported a venous thrombosis in the right popliteal vein and peroneal vein.  The thrombus was not occlusive.  Other veins were clear of thrombosis.    I do not see lab study results for hypercoagulable status.  The clinic noted dated 3/31/08 categorized this, patient has primary hypercoagulable status but there was no mention of the abnormalities of the laboratory results.  The notes on 3/10/08 did not   mention neither.  Clinic notes 10/26/12 did not mention the abnormalities, only described that this patient needed long-term Coumadin anticoagulation.          According to the patient after the initial diagnosis in January 2008, he was taking not compliant and he did not followup as required for PT/INR monitoring at Dr. Luna'  s.  Eventually he was fired in the end of 2012 and he did not have a primary care physician and it sounds like he was not taking the Coumadin properly.    Since late April 2013, he had sign  ificant swelling of the left leg including the thigh area.  He had venous duplex study reporting occlusive right superficial femoral vein thrombosis and right popliteal vein thrombosis.  Since that time he has been taking Coumadin religiously according to     him.  His current dose is 10 mg and 7.5 mg alternating every other day.  Today his INR is 2.6.  I instructed him to take Coumadin 10 mg on Tuesday, Thursdays and Saturdays and the rest of the days taking 7.5 mg and he will have repeat labs next  week at th  e time of our evaluation. In the meantime, I will request complete hypercoagulable status workup for evaluation.           Lab studies obtained on 05/07/13 reported heterozygous factor II [id number] mutation, negative for factor V Leiden mutation.  The patient also   tested negative for anticardiolipin antibodies and antiphosphatidylserine antibodies, negative for lupus anticoagulant and anti-beta 2 GPI antibodies.  Antithrombin-III activity was 104%.  The patient had depressed protein C activity of 16%, depressed protein S activity of 49%, and protein S free antigen 50%.  The depressed protein C and protein S activity levels likely are related to Coumadin therapy.          MEDICATIONS    Current Outpatient Medications:     apixaban (ELIQUIS) 2.5 MG tablet tablet, Take 1 tablet by mouth 2 (Two) Times a Day., Disp: 90 tablet, Rfl: 1    tadalafil (CIALIS) 10 MG tablet, TAKE 1 TABLET BY MOUTH ONCE DAILY AS NEEDED FOR ERECTILE DYSFUNCTION, Disp: 30 tablet, Rfl: 5    valACYclovir (VALTREX) 1000 MG tablet, TAKE 2 TABLETS BY MOUTH TWICE DAILY FOR 1 DAY, Disp: , Rfl:     rosuvastatin (CRESTOR) 10 MG tablet, Take 1 tablet by mouth Every Night. (Patient not taking: Reported on 6/4/2025), Disp: 90 tablet, Rfl: 1    ALLERGIES:   No Known Allergies    SOCIAL HISTORY:       Social History     Socioeconomic History    Marital status:      Spouse name: Ale   Tobacco Use    Smoking status: Never     Passive exposure: Never    Smokeless tobacco: Never   Vaping Use    Vaping status: Never Used   Substance and Sexual Activity    Alcohol use: Yes     Alcohol/week: 6.0 standard drinks of alcohol     Types: 6 Cans of beer per week     Comment: 6 weekly    Drug use: Never    Sexual activity: Yes     Partners: Female         FAMILY HISTORY:  Family History   Problem Relation Age of Onset    Heart disease Father     Breast cancer Sister     Leukemia Sister     Cancer Sister         Passed away 2023    Clotting disorder  "Sister        REVIEW OF SYSTEMS:  HPI.         Vitals:    06/04/25 1452   BP: 127/82   Pulse: 73   Temp: 98.7 °F (37.1 °C)   TempSrc: Oral   SpO2: 94%   Weight: 119 kg (261 lb 8 oz)   Height: 180.3 cm (70.98\")   PainSc: 0-No pain           6/4/2025     2:54 PM   Current Status   ECOG score 0     Physical Exam  Musculoskeletal: Right leg shows slight swelling compared to the left leg. Left leg has trace edema.    Physical Exam  Vitals and nursing note reviewed.   Constitutional:       Appearance: Normal appearance. He is well-developed.   HENT:      Head: Normocephalic and atraumatic.   Eyes:      Conjunctiva/sclera: Conjunctivae normal.   Cardiovascular:      Rate and Rhythm: Normal rate and regular rhythm.      Heart sounds: Normal heart sounds.   Pulmonary:      Effort: Pulmonary effort is normal.      Breath sounds: Normal breath sounds.   Abdominal:      General: Bowel sounds are normal.      Palpations: Abdomen is soft.   Musculoskeletal:      Right lower leg: Edema present.      Left lower leg: Edema present.      Comments: Trace bilateral LE edema right slightly worse than left   Neurological:      Mental Status: He is alert. Mental status is at baseline.   Psychiatric:         Mood and Affect: Mood normal.           RECENT LABS:  Lab Results   Component Value Date    WBC 8.12 06/04/2025    HGB 14.8 06/04/2025    HCT 45.0 06/04/2025    MCV 91.5 06/04/2025     06/04/2025     Lab Results   Component Value Date    NEUTROABS 5.07 06/04/2025       Assessment & Plan     Assessment & Plan      ASSESSMENT  1. Right leg recurrent DVT with in the background of factor II/prothrombin gene heterozygous mutation which was discovered in 05/2013.   He was tested negative otherwise. His protein C and protein S study previously was done when he was on Coumadin therapy. Those need to be repeated in the future.    We discussed with the patient on 3/19/2024 that we use the new NOAC oral anticoagulation medications such as " Eliquis, Pradaxa, or Xarelto more widely than before. They are more convenient and effective, and I think the benefit is that he can be placed on low dose maintenance anticoagulation instead of full dose Coumadin. I discussed with the patient the pros and the cons of those new medications, and he is willing to try.   Patient started on low-dose maintenance Eliquis 2.5 mg twice daily.  Coumadin was discontinued.  Seen in follow-up 6/11/2024 continuing Eliquis 2.5 mg twice daily, tolerating well.  Reviewed CBC today and his hemoglobin is stable.  His white blood cell count is slightly elevated but he did have a steroid injection recently.    On 12/4/2024 patient presented for reevaluation.  He is currently on Eliquis 2.5 mg twice a day as maintenance anticoagulation. He reports no new symptoms, easy bleeding, bruising, or leg swelling. He was advised to wear compression socks, particularly on the right leg.  A follow-up blood test for protein C and protein S profile will be conducted, which were low during his previous Coumadin treatment.  We need to see whether this patient actually has protein S and protein C deficiency.  6/4/2025 patient presented for evaluation.  Patient reports doing well on Eliquis anticoagulation without bleeding bruising.  Laboratory studies today reported  completely normal CBC. Protein C and protein S profile are pending. A prescription for Eliquis 2.5 mg twice a day has been issued, with refills available at the pharmacy.        PLAN:   Continue Eliquis 2.5 mg twice daily.   A prescription for Eliquis 2.5 mg twice a day has been issued, with refills to his pharmacy.  Protein C and protein S profile are pending.  Patient encouraged to wear compression socks to help with swelling.  Patient will see APRN in 6 months for reevaluation.  Will have a CBC check.    Call/ return sooner should the patient develop any new concerns or problems.    I discussed with the patient about laboratory results  and further management plan.  Patient voiced understanding and agreeable.      ORAL TODD M.D., Ph.D.      Addendum:    Latest Reference Range & Units 06/04/25 14:11   Protein C Activity 86 - 163 % 132   Protein C Antigen 60 - 150 % 113   Protein S Ag, Free 49.0 - 138.0 % 92.0   Protein S Functional 70 - 127 % 90     Patient had completely normal protein C activity and protein S activity.      ORAL TODD M.D., Ph.D.        CC:    Catrachita Leung APRN McGee, Suzanne E, MD

## 2025-06-06 LAB
PROT C ACT/NOR PPP: 132 % (ref 86–163)
PROT C AG ACT/NOR PPP IA: 113 % (ref 60–150)
PROT S ACT/NOR PPP: 90 % (ref 70–127)
PROT S FREE PPP-ACNC: 92 % (ref 49–138)

## 2025-06-11 ENCOUNTER — TREATMENT (OUTPATIENT)
Dept: PHYSICAL THERAPY | Facility: CLINIC | Age: 55
End: 2025-06-11
Payer: COMMERCIAL

## 2025-06-11 DIAGNOSIS — M75.01 ADHESIVE CAPSULITIS OF RIGHT SHOULDER: Primary | ICD-10-CM

## 2025-06-11 DIAGNOSIS — M25.511 CHRONIC RIGHT SHOULDER PAIN: ICD-10-CM

## 2025-06-11 DIAGNOSIS — G89.29 CHRONIC RIGHT SHOULDER PAIN: ICD-10-CM

## 2025-06-11 DIAGNOSIS — R26.89 DECREASED FUNCTIONAL MOBILITY: ICD-10-CM

## 2025-06-11 NOTE — PROGRESS NOTES
Physical Therapy Daily Treatment Note  [facility] [place]  [address], [address]  [place] [id number]     Patient: Darshan Frias  : 1970  Referring practitioner: Bessie Bergeron MD  Today's Date: 2025    VISIT#: 2    Visit Diagnoses:    ICD-10-CM ICD-9-CM   1. Adhesive capsulitis of right shoulder  M75.01 726.0   2. Chronic right shoulder pain  M25.511 719.41    G89.29 338.29   3. Decreased functional mobility  R26.89 781.99       Subjective   Darshan Frias reports: that he has been doing well. His shoulder is feeling pretty good. He tweaked his shoulder earlier today but states it is not too bad.       Objective       See Exercise, Manual, and Modality Logs for complete treatment.         Assessment/Plan  The patient tolerates the treatment session without increase in symptoms. He requires minimal verbal cueing for technique throughout the session and is able to correct his form. He tolerates flexibility exercises well. He states that he has to leave early for a work call. He would continue to benefit from skilled intervention with focus on UE ROM and strength.       Progress per Plan of Care          Timed:         Manual Therapy:    8     mins  41823;     Therapeutic Exercise:    15     mins  19374;     Neuromuscular Wero:        mins  92192;    Therapeutic Activity:     15     mins  11005;     Gait Training:           mins  21082;      Ionto:                                   mins  83367  Self Care:                            mins  50174    Un-Timed:  Electrical Stimulation:         mins  91246 (MC );  Dry Needling          mins self-pay  Traction          mins 35974  Re-Eval                               mins  14912  Group Therapy           ___ mins 90951    Timed Treatment:   38   mins   Total Treatment:     41   mins    Radha Carrera PT  Physical Therapist  [place] PT license #: [id number]

## 2025-06-17 ENCOUNTER — TREATMENT (OUTPATIENT)
Dept: PHYSICAL THERAPY | Facility: CLINIC | Age: 55
End: 2025-06-17
Payer: COMMERCIAL

## 2025-06-17 DIAGNOSIS — G89.29 CHRONIC RIGHT SHOULDER PAIN: ICD-10-CM

## 2025-06-17 DIAGNOSIS — M25.511 CHRONIC RIGHT SHOULDER PAIN: ICD-10-CM

## 2025-06-17 DIAGNOSIS — M75.01 ADHESIVE CAPSULITIS OF RIGHT SHOULDER: Primary | ICD-10-CM

## 2025-06-17 DIAGNOSIS — R26.89 DECREASED FUNCTIONAL MOBILITY: ICD-10-CM

## 2025-06-17 PROCEDURE — 97110 THERAPEUTIC EXERCISES: CPT | Performed by: PHYSICAL THERAPIST

## 2025-06-17 PROCEDURE — 97140 MANUAL THERAPY 1/> REGIONS: CPT | Performed by: PHYSICAL THERAPIST

## 2025-06-17 PROCEDURE — 97530 THERAPEUTIC ACTIVITIES: CPT | Performed by: PHYSICAL THERAPIST

## 2025-06-17 PROCEDURE — 97112 NEUROMUSCULAR REEDUCATION: CPT | Performed by: PHYSICAL THERAPIST

## 2025-06-17 NOTE — PROGRESS NOTES
Physical Therapy Daily Treatment Note  [facility] Physical Therapy [place]  [address]  [place] [id number]     Patient: Darshan Frias  : 1970  Referring practitioner: Bessie Bergeron MD  Today's Date: 2025    VISIT#: 3    Visit Diagnoses:    ICD-10-CM ICD-9-CM   1. Adhesive capsulitis of right shoulder  M75.01 726.0   2. Chronic right shoulder pain  M25.511 719.41    G89.29 338.29   3. Decreased functional mobility  R26.89 781.99       Subjective   Darshan Frias reports: that his shoulder feels okay today. It is a little stiff as he just woke up.       Objective       See Exercise, Manual, and Modality Logs for complete treatment.         Assessment/Plan  The patient tolerates the treatment session without increase in symptoms. He requires minimal verbal cueing for technique throughout the session and is able to correct his form. Isometrics are introduced to promote UE strength. He finds this challenging but denies pain. He tolerates UE PROM well. He is progressing well and would continue to benefit from skilled intervention with focus on UE ROM and strength.       Progress per Plan of Care          Timed:         Manual Therapy:    8     mins  20649;     Therapeutic Exercise:    10     mins  16358;     Neuromuscular Wero:    10    mins  82908;    Therapeutic Activity:     10     mins  22359;     Gait Training:           mins  34750;      Ionto:                                   mins  43914  Self Care:                            mins  61144    Un-Timed:  Electrical Stimulation:         mins  13639 ( );  Dry Needling          mins self-pay  Traction          mins 41459  Re-Eval                               mins  26942  Group Therapy           ___ mins 37741    Timed Treatment:   38   mins   Total Treatment:     41   mins    Radha Carrera PT  Physical Therapist  [place] PT license #: [id number]

## 2025-06-24 ENCOUNTER — TREATMENT (OUTPATIENT)
Dept: PHYSICAL THERAPY | Facility: CLINIC | Age: 55
End: 2025-06-24
Payer: COMMERCIAL

## 2025-06-24 DIAGNOSIS — M25.511 CHRONIC RIGHT SHOULDER PAIN: ICD-10-CM

## 2025-06-24 DIAGNOSIS — R26.89 DECREASED FUNCTIONAL MOBILITY: ICD-10-CM

## 2025-06-24 DIAGNOSIS — M75.01 ADHESIVE CAPSULITIS OF RIGHT SHOULDER: Primary | ICD-10-CM

## 2025-06-24 DIAGNOSIS — G89.29 CHRONIC RIGHT SHOULDER PAIN: ICD-10-CM

## 2025-06-24 PROCEDURE — 97110 THERAPEUTIC EXERCISES: CPT | Performed by: PHYSICAL THERAPIST

## 2025-06-24 PROCEDURE — 97530 THERAPEUTIC ACTIVITIES: CPT | Performed by: PHYSICAL THERAPIST

## 2025-06-24 PROCEDURE — 97112 NEUROMUSCULAR REEDUCATION: CPT | Performed by: PHYSICAL THERAPIST

## 2025-06-24 NOTE — PROGRESS NOTES
Physical Therapy Daily Treatment Note  [facility] [place]  [address]  [place] [address]     Patient: Darshan Frias  : 1970  Referring practitioner: Bessie Bergeron MD  Today's Date: 2025    VISIT#: 4    Visit Diagnoses:    ICD-10-CM ICD-9-CM   1. Adhesive capsulitis of right shoulder  M75.01 726.0   2. Chronic right shoulder pain  M25.511 719.41    G89.29 338.29   3. Decreased functional mobility  R26.89 781.99       Subjective   Darshan Frias reports: that his shoulder feels fine today but he had some problems over the weekend when he picked up his heavy toolbox.       Objective       See Exercise, Manual, and Modality Logs for complete treatment.         Assessment/Plan  The patient tolerates the treatment session without increase in symptoms. He requires minimal verbal cueing for technique throughout the session and is able to correct his form. Cane flexion is progressed from supine to standing. He tolerates this progression well. Band assisted shoulder flexion is introduced to promote UE strength and ROM. He finds this challenging but denies pain. He would continue to benefit from skilled intervention with focus on UE ROM.      Progress per Plan of Care          Timed:         Manual Therapy:         mins  92981;     Therapeutic Exercise:    15     mins  79504;     Neuromuscular Wero:    10    mins  02285;    Therapeutic Activity:     10     mins  56022;     Gait Training:           mins  20005;      Ionto:                                   mins  77828  Self Care:                            mins  51826    Un-Timed:  Electrical Stimulation:         mins  36221 ( );  Dry Needling          mins self-pay  Traction          mins 78883  Re-Eval                               mins  22996  Group Therapy           ___ mins 04265    Timed Treatment:   35   mins   Total Treatment:     37   mins    Radha Carrera PT  Physical Therapist  [place]  PT license #: [id number]

## 2025-06-26 ENCOUNTER — TREATMENT (OUTPATIENT)
Dept: PHYSICAL THERAPY | Facility: CLINIC | Age: 55
End: 2025-06-26
Payer: COMMERCIAL

## 2025-06-26 DIAGNOSIS — G89.29 CHRONIC RIGHT SHOULDER PAIN: ICD-10-CM

## 2025-06-26 DIAGNOSIS — M25.511 CHRONIC RIGHT SHOULDER PAIN: ICD-10-CM

## 2025-06-26 DIAGNOSIS — R26.89 DECREASED FUNCTIONAL MOBILITY: ICD-10-CM

## 2025-06-26 DIAGNOSIS — M75.01 ADHESIVE CAPSULITIS OF RIGHT SHOULDER: Primary | ICD-10-CM

## 2025-06-26 NOTE — PROGRESS NOTES
Physical Therapy Daily Treatment Note and Progress Note  [facility] Physical Therapy [place]  [address], [address]  [place] [address]     Patient: Darshan Frias  : 1970  Referring practitioner: Bessie Bergeron MD  Today's Date: 2025    VISIT#: 5    Visit Diagnoses:    ICD-10-CM ICD-9-CM   1. Adhesive capsulitis of right shoulder  M75.01 726.0   2. Chronic right shoulder pain  M25.511 719.41    G89.29 338.29   3. Decreased functional mobility  R26.89 781.99     QuickDASH: 6.82 (improved)    Subjective   Darshan Frias reports: that his shoulder is a little stiff as he just got up. He feels okay overall. Overall, he feels his shoulder has improved with therapy. He notes improved ROM and improved strength. He notes improved function with reaching overhead, reaching out to the side, reaching behind his back, and sleeping. He continues to have some discomfort with reaching too far out to the side. He feels he is about 70% recovered. Current pain 0/10. At worst his pain is a 6/10 when reaching out to the side and lifting his tool box. At best pain is a 0/10.      Objective   Active Range of Motion   Left Shoulder   Flexion: 165 degrees (Improved)  Extension: 70 degrees (same)  Abduction: 172 degrees (improved)  External rotation 0°: 70 degrees (same)     Right Shoulder   Flexion: 155 degrees (improved)  Extension: 65 degrees (improved)  Abduction: 162 (pain at end range) degrees (improved)  External rotation 0°: 85 degrees (improved)     Additional Active Range of Motion Details  FUNCTIONAL IR BEHIND BACK   L: to T 12 (same)  R: to L3 (sight pain* at end range)(improved)     BEHIND HEAD:  L: to T4 (same)  R: to T3 (same)     Strength/Myotome Testing      Left Shoulder      Planes of Motion   Flexion: 5 (same)  Abduction: 5 (same)  External rotation at 0°: 5 (same)  Internal rotation at 0°: 5 (same)     Right Shoulder      Planes of Motion   Flexion: 5 (improved)  Abduction: 5  (same)  External rotation at 0°: 5 (same)  Internal rotation at 0°: 5  (same)    See Exercise, Manual, and Modality Logs for complete treatment.         Assessment/Plan  Subjectively, the patient reports improvement with therapy. He notes improved ROM and improved strength. He also notes improved function with sleeping and reaching in all directions. He states that shoulder abduction is the most uncomfortable. Objectively, the patient demonstrates improved UE ROM in all planes and improved UE strength. He notes decreased pain with objective testing compared to previously. The patient tolerates the treatment session without increase in symptoms. He requires minimal verbal cueing for technique throughout the session and is able to correct his form. He is able to progress the intensity of supine circles. He finds this challenging but denies pain. He is advised to continue with current HEP. He states that he has to leave early for a work meeting. He plans to hold therapy until his MD follow up next week.       Other  Hold until MD follow up     Goals  Plan Goals: STG: (to be met in 4 weeks)  1. Pt will be independent and compliant with initial HEP without increasing baseline pain level. (Met)  2. Pt will self report a 25% improvement in symptoms since starting therapy. (Met)  3. Pt will report pain level at worst <7 in order to improve ability to complete ADLs and sleep. (Met)  4. Pt will demonstrate an increase in R flexion/abduction AROM with good mechanics to >150 degrees. (Met)     LTG: - by DC (12 weeks)  1. Pt will be independent with final HEP for self-management of condition. (Met)  2. Pt will improve score on QuickDASH to less than 25% in order to show improved functional use of UE. (Met)  3. Pt will report a 50% improvement in symptoms in order to allow return to PLOF. (Met)  4. Pt will improve R shoulder flexion/abduction to at least 165 deg in order to be able to reach into cabinet to get cup or plate and to  wash hair & back without pain.(Good progress)  5. Pt will improve shoulder strength to at least 4+/5 in order to be able to lift at least 5# overhead so that they are able to put dishes and groceries away. (Met)     Timed:         Manual Therapy:         mins  29471;     Therapeutic Exercise:    10     mins  75600;     Neuromuscular Wero:    10    mins  23224;    Therapeutic Activity:     10     mins  79355;     Gait Training:           mins  89515;      Ionto:                                   mins  76084  Self Care:                            mins  33823    Un-Timed:  Electrical Stimulation:         mins  76487 ( );  Dry Needling          mins self-pay  Traction          mins 81283  Re-Eval                               mins  71904  Group Therapy           ___ mins 09870    Timed Treatment:   30   mins   Total Treatment:     30   mins    Radha Carrera PT  Physical Therapist  [place] license #: [id number]